# Patient Record
Sex: FEMALE | Race: BLACK OR AFRICAN AMERICAN | Employment: FULL TIME | URBAN - METROPOLITAN AREA
[De-identification: names, ages, dates, MRNs, and addresses within clinical notes are randomized per-mention and may not be internally consistent; named-entity substitution may affect disease eponyms.]

---

## 2018-05-24 ENCOUNTER — HOSPITAL ENCOUNTER (EMERGENCY)
Age: 43
Discharge: HOME | End: 2018-05-25
Payer: COMMERCIAL

## 2018-05-24 ENCOUNTER — APPOINTMENT (OUTPATIENT)
Dept: GENERAL RADIOLOGY | Age: 43
End: 2018-05-24
Payer: COMMERCIAL

## 2018-05-24 VITALS
TEMPERATURE: 97.6 F | WEIGHT: 189 LBS | BODY MASS INDEX: 34.78 KG/M2 | DIASTOLIC BLOOD PRESSURE: 79 MMHG | RESPIRATION RATE: 16 BRPM | OXYGEN SATURATION: 98 % | HEIGHT: 62 IN | SYSTOLIC BLOOD PRESSURE: 129 MMHG | HEART RATE: 80 BPM

## 2018-05-24 DIAGNOSIS — R07.89 CHEST WALL PAIN: Primary | ICD-10-CM

## 2018-05-24 LAB
ALBUMIN SERPL-MCNC: 4.2 G/DL (ref 3.5–5.2)
ALP BLD-CCNC: 82 U/L (ref 35–104)
ALT SERPL-CCNC: 13 U/L (ref 0–32)
ANION GAP SERPL CALCULATED.3IONS-SCNC: 14 MMOL/L (ref 7–16)
AST SERPL-CCNC: 14 U/L (ref 0–31)
BACTERIA: ABNORMAL /HPF
BASOPHILS ABSOLUTE: 0.05 E9/L (ref 0–0.2)
BASOPHILS RELATIVE PERCENT: 0.6 % (ref 0–2)
BILIRUB SERPL-MCNC: <0.2 MG/DL (ref 0–1.2)
BILIRUBIN URINE: NEGATIVE
BLOOD, URINE: ABNORMAL
BUN BLDV-MCNC: 15 MG/DL (ref 6–20)
CALCIUM SERPL-MCNC: 9.1 MG/DL (ref 8.6–10.2)
CHLORIDE BLD-SCNC: 104 MMOL/L (ref 98–107)
CHP ED QC CHECK: YES
CLARITY: CLEAR
CO2: 24 MMOL/L (ref 22–29)
COLOR: YELLOW
CREAT SERPL-MCNC: 1.1 MG/DL (ref 0.5–1)
EKG ATRIAL RATE: 74 BPM
EKG P AXIS: 66 DEGREES
EKG P-R INTERVAL: 164 MS
EKG Q-T INTERVAL: 372 MS
EKG QRS DURATION: 88 MS
EKG QTC CALCULATION (BAZETT): 412 MS
EKG R AXIS: 45 DEGREES
EKG T AXIS: 49 DEGREES
EKG VENTRICULAR RATE: 74 BPM
EOSINOPHILS ABSOLUTE: 0.36 E9/L (ref 0.05–0.5)
EOSINOPHILS RELATIVE PERCENT: 4 % (ref 0–6)
EPITHELIAL CELLS, UA: ABNORMAL /HPF
GFR AFRICAN AMERICAN: >60
GFR NON-AFRICAN AMERICAN: >60 ML/MIN/1.73
GLUCOSE BLD-MCNC: 97 MG/DL (ref 74–109)
GLUCOSE URINE: NEGATIVE MG/DL
HCT VFR BLD CALC: 38.4 % (ref 34–48)
HEMOGLOBIN: 12.1 G/DL (ref 11.5–15.5)
IMMATURE GRANULOCYTES #: 0.02 E9/L
IMMATURE GRANULOCYTES %: 0.2 % (ref 0–5)
KETONES, URINE: ABNORMAL MG/DL
LEUKOCYTE ESTERASE, URINE: NEGATIVE
LYMPHOCYTES ABSOLUTE: 3.18 E9/L (ref 1.5–4)
LYMPHOCYTES RELATIVE PERCENT: 35.4 % (ref 20–42)
MCH RBC QN AUTO: 28.2 PG (ref 26–35)
MCHC RBC AUTO-ENTMCNC: 31.5 % (ref 32–34.5)
MCV RBC AUTO: 89.5 FL (ref 80–99.9)
MONOCYTES ABSOLUTE: 0.61 E9/L (ref 0.1–0.95)
MONOCYTES RELATIVE PERCENT: 6.8 % (ref 2–12)
NEUTROPHILS ABSOLUTE: 4.76 E9/L (ref 1.8–7.3)
NEUTROPHILS RELATIVE PERCENT: 53 % (ref 43–80)
NITRITE, URINE: NEGATIVE
PDW BLD-RTO: 13.1 FL (ref 11.5–15)
PH UA: 6 (ref 5–9)
PLATELET # BLD: 235 E9/L (ref 130–450)
PMV BLD AUTO: 10.1 FL (ref 7–12)
POTASSIUM SERPL-SCNC: 3.6 MMOL/L (ref 3.5–5)
PREGNANCY TEST URINE, POC: NORMAL
PROTEIN UA: NEGATIVE MG/DL
RBC # BLD: 4.29 E12/L (ref 3.5–5.5)
RBC UA: ABNORMAL /HPF (ref 0–2)
SODIUM BLD-SCNC: 142 MMOL/L (ref 132–146)
SPECIFIC GRAVITY UA: 1.02 (ref 1–1.03)
TOTAL PROTEIN: 6.9 G/DL (ref 6.4–8.3)
TROPONIN: <0.01 NG/ML (ref 0–0.03)
UROBILINOGEN, URINE: 1 E.U./DL
WBC # BLD: 9 E9/L (ref 4.5–11.5)
WBC UA: ABNORMAL /HPF (ref 0–5)

## 2018-05-24 PROCEDURE — 71046 X-RAY EXAM CHEST 2 VIEWS: CPT

## 2018-05-24 PROCEDURE — 85025 COMPLETE CBC W/AUTO DIFF WBC: CPT

## 2018-05-24 PROCEDURE — 36415 COLL VENOUS BLD VENIPUNCTURE: CPT

## 2018-05-24 PROCEDURE — 81001 URINALYSIS AUTO W/SCOPE: CPT

## 2018-05-24 PROCEDURE — 84484 ASSAY OF TROPONIN QUANT: CPT

## 2018-05-24 PROCEDURE — 80053 COMPREHEN METABOLIC PANEL: CPT

## 2018-05-24 PROCEDURE — 99285 EMERGENCY DEPT VISIT HI MDM: CPT

## 2018-05-24 RX ORDER — KETOROLAC TROMETHAMINE 30 MG/ML
30 INJECTION, SOLUTION INTRAMUSCULAR; INTRAVENOUS ONCE
Status: DISCONTINUED | OUTPATIENT
Start: 2018-05-24 | End: 2018-05-25

## 2018-05-24 RX ORDER — 0.9 % SODIUM CHLORIDE 0.9 %
1000 INTRAVENOUS SOLUTION INTRAVENOUS ONCE
Status: DISCONTINUED | OUTPATIENT
Start: 2018-05-24 | End: 2018-05-25

## 2018-05-24 RX ORDER — ONDANSETRON 2 MG/ML
4 INJECTION INTRAMUSCULAR; INTRAVENOUS ONCE
Status: DISCONTINUED | OUTPATIENT
Start: 2018-05-24 | End: 2018-05-25

## 2018-05-24 ASSESSMENT — PAIN DESCRIPTION - PAIN TYPE: TYPE: ACUTE PAIN

## 2018-05-24 ASSESSMENT — HEART SCORE: ECG: 0

## 2018-05-24 ASSESSMENT — PAIN DESCRIPTION - DESCRIPTORS: DESCRIPTORS: SHARP

## 2018-05-24 ASSESSMENT — PAIN DESCRIPTION - FREQUENCY: FREQUENCY: INTERMITTENT

## 2018-05-24 ASSESSMENT — PAIN SCALES - GENERAL: PAINLEVEL_OUTOF10: 7

## 2018-05-25 PROCEDURE — 96372 THER/PROPH/DIAG INJ SC/IM: CPT

## 2018-05-25 PROCEDURE — 6360000002 HC RX W HCPCS: Performed by: NURSE PRACTITIONER

## 2018-05-25 RX ORDER — ONDANSETRON 4 MG/1
4 TABLET, ORALLY DISINTEGRATING ORAL ONCE
Status: COMPLETED | OUTPATIENT
Start: 2018-05-25 | End: 2018-05-25

## 2018-05-25 RX ORDER — NAPROXEN 500 MG/1
500 TABLET ORAL 2 TIMES DAILY PRN
Qty: 28 TABLET | Refills: 0 | Status: SHIPPED | OUTPATIENT
Start: 2018-05-25

## 2018-05-25 RX ORDER — KETOROLAC TROMETHAMINE 30 MG/ML
30 INJECTION, SOLUTION INTRAMUSCULAR; INTRAVENOUS ONCE
Status: COMPLETED | OUTPATIENT
Start: 2018-05-25 | End: 2018-05-25

## 2018-05-25 RX ADMIN — KETOROLAC TROMETHAMINE 30 MG: 30 INJECTION, SOLUTION INTRAMUSCULAR; INTRAVENOUS at 00:39

## 2018-05-25 RX ADMIN — ONDANSETRON 4 MG: 4 TABLET, ORALLY DISINTEGRATING ORAL at 00:39

## 2018-05-25 ASSESSMENT — PAIN SCALES - GENERAL: PAINLEVEL_OUTOF10: 4

## 2019-01-25 ENCOUNTER — HOSPITAL ENCOUNTER (OUTPATIENT)
Dept: ULTRASOUND IMAGING | Age: 44
Discharge: HOME | End: 2019-01-27
Payer: OTHER GOVERNMENT

## 2019-01-25 DIAGNOSIS — E01.0 THYROMEGALY: ICD-10-CM

## 2019-01-25 PROCEDURE — 76536 US EXAM OF HEAD AND NECK: CPT

## 2019-04-12 ENCOUNTER — HOSPITAL ENCOUNTER (OUTPATIENT)
Dept: GENERAL RADIOLOGY | Age: 44
Discharge: HOME | End: 2019-04-14
Payer: OTHER GOVERNMENT

## 2019-04-12 DIAGNOSIS — Z12.31 VISIT FOR SCREENING MAMMOGRAM: ICD-10-CM

## 2019-04-12 PROCEDURE — 77067 SCR MAMMO BI INCL CAD: CPT

## 2019-09-10 ENCOUNTER — HOSPITAL ENCOUNTER (EMERGENCY)
Age: 44
Discharge: HOME | End: 2019-09-10
Attending: EMERGENCY MEDICINE
Payer: COMMERCIAL

## 2019-09-10 VITALS
DIASTOLIC BLOOD PRESSURE: 88 MMHG | OXYGEN SATURATION: 98 % | BODY MASS INDEX: 34.96 KG/M2 | SYSTOLIC BLOOD PRESSURE: 126 MMHG | HEART RATE: 76 BPM | HEIGHT: 62 IN | TEMPERATURE: 98.5 F | RESPIRATION RATE: 16 BRPM | WEIGHT: 190 LBS

## 2019-09-10 DIAGNOSIS — S46.811A STRAIN OF RIGHT TRAPEZIUS MUSCLE, INITIAL ENCOUNTER: Primary | ICD-10-CM

## 2019-09-10 PROCEDURE — 99282 EMERGENCY DEPT VISIT SF MDM: CPT

## 2019-09-10 PROCEDURE — 6370000000 HC RX 637 (ALT 250 FOR IP): Performed by: EMERGENCY MEDICINE

## 2019-09-10 RX ORDER — IBUPROFEN 600 MG/1
600 TABLET ORAL ONCE
Status: COMPLETED | OUTPATIENT
Start: 2019-09-10 | End: 2019-09-10

## 2019-09-10 RX ADMIN — IBUPROFEN 600 MG: 600 TABLET, FILM COATED ORAL at 16:22

## 2019-09-10 ASSESSMENT — PAIN DESCRIPTION - ONSET: ONSET: SUDDEN

## 2019-09-10 ASSESSMENT — PAIN DESCRIPTION - FREQUENCY: FREQUENCY: CONTINUOUS

## 2019-09-10 ASSESSMENT — PAIN SCALES - GENERAL
PAINLEVEL_OUTOF10: 5

## 2019-09-10 ASSESSMENT — PAIN DESCRIPTION - LOCATION
LOCATION: SHOULDER
LOCATION: SHOULDER;NECK

## 2019-09-10 ASSESSMENT — PAIN DESCRIPTION - ORIENTATION
ORIENTATION: RIGHT
ORIENTATION: RIGHT

## 2019-09-10 ASSESSMENT — PAIN DESCRIPTION - PAIN TYPE
TYPE: ACUTE PAIN
TYPE: ACUTE PAIN

## 2019-09-10 ASSESSMENT — PAIN DESCRIPTION - PROGRESSION: CLINICAL_PROGRESSION: GRADUALLY WORSENING

## 2019-09-10 ASSESSMENT — PAIN DESCRIPTION - DESCRIPTORS
DESCRIPTORS: CONSTANT;DISCOMFORT
DESCRIPTORS: ACHING;CONSTANT;DISCOMFORT

## 2019-12-03 ENCOUNTER — HOSPITAL ENCOUNTER (EMERGENCY)
Age: 44
Discharge: HOME | End: 2019-12-04
Payer: OTHER GOVERNMENT

## 2019-12-03 VITALS
SYSTOLIC BLOOD PRESSURE: 135 MMHG | TEMPERATURE: 98 F | BODY MASS INDEX: 34.96 KG/M2 | WEIGHT: 190 LBS | DIASTOLIC BLOOD PRESSURE: 94 MMHG | RESPIRATION RATE: 15 BRPM | HEIGHT: 62 IN | HEART RATE: 69 BPM

## 2019-12-03 DIAGNOSIS — M79.605 LEFT LEG PAIN: Primary | ICD-10-CM

## 2019-12-03 LAB
APTT: 29.3 SEC (ref 24.5–35.1)
BASOPHILS ABSOLUTE: 0.03 E9/L (ref 0–0.2)
BASOPHILS RELATIVE PERCENT: 0.4 % (ref 0–2)
EOSINOPHILS ABSOLUTE: 0.24 E9/L (ref 0.05–0.5)
EOSINOPHILS RELATIVE PERCENT: 2.9 % (ref 0–6)
HCT VFR BLD CALC: 39.4 % (ref 34–48)
HEMOGLOBIN: 11.6 G/DL (ref 11.5–15.5)
IMMATURE GRANULOCYTES #: 0.02 E9/L
IMMATURE GRANULOCYTES %: 0.2 % (ref 0–5)
INR BLD: 1
LYMPHOCYTES ABSOLUTE: 2.64 E9/L (ref 1.5–4)
LYMPHOCYTES RELATIVE PERCENT: 31.9 % (ref 20–42)
MCH RBC QN AUTO: 26.7 PG (ref 26–35)
MCHC RBC AUTO-ENTMCNC: 29.4 % (ref 32–34.5)
MCV RBC AUTO: 90.8 FL (ref 80–99.9)
MONOCYTES ABSOLUTE: 0.57 E9/L (ref 0.1–0.95)
MONOCYTES RELATIVE PERCENT: 6.9 % (ref 2–12)
NEUTROPHILS ABSOLUTE: 4.78 E9/L (ref 1.8–7.3)
NEUTROPHILS RELATIVE PERCENT: 57.7 % (ref 43–80)
PDW BLD-RTO: 13.3 FL (ref 11.5–15)
PLATELET # BLD: 227 E9/L (ref 130–450)
PMV BLD AUTO: 10.2 FL (ref 7–12)
PROTHROMBIN TIME: 11.1 SEC (ref 9.3–12.4)
RBC # BLD: 4.34 E12/L (ref 3.5–5.5)
WBC # BLD: 8.3 E9/L (ref 4.5–11.5)

## 2019-12-03 PROCEDURE — 85730 THROMBOPLASTIN TIME PARTIAL: CPT

## 2019-12-03 PROCEDURE — 6370000000 HC RX 637 (ALT 250 FOR IP): Performed by: NURSE PRACTITIONER

## 2019-12-03 PROCEDURE — 85025 COMPLETE CBC W/AUTO DIFF WBC: CPT

## 2019-12-03 PROCEDURE — 85610 PROTHROMBIN TIME: CPT

## 2019-12-03 PROCEDURE — 36415 COLL VENOUS BLD VENIPUNCTURE: CPT

## 2019-12-03 PROCEDURE — 99283 EMERGENCY DEPT VISIT LOW MDM: CPT

## 2019-12-03 RX ORDER — OXYCODONE HYDROCHLORIDE AND ACETAMINOPHEN 5; 325 MG/1; MG/1
1 TABLET ORAL ONCE
Status: COMPLETED | OUTPATIENT
Start: 2019-12-03 | End: 2019-12-03

## 2019-12-03 RX ADMIN — OXYCODONE HYDROCHLORIDE AND ACETAMINOPHEN 1 TABLET: 5; 325 TABLET ORAL at 23:35

## 2019-12-03 ASSESSMENT — PAIN DESCRIPTION - LOCATION: LOCATION: LEG

## 2019-12-03 ASSESSMENT — PAIN DESCRIPTION - PAIN TYPE: TYPE: ACUTE PAIN

## 2019-12-03 ASSESSMENT — PAIN DESCRIPTION - ORIENTATION: ORIENTATION: LEFT

## 2019-12-03 ASSESSMENT — PAIN SCALES - GENERAL: PAINLEVEL_OUTOF10: 7

## 2019-12-04 ENCOUNTER — HOSPITAL ENCOUNTER (OUTPATIENT)
Dept: ULTRASOUND IMAGING | Age: 44
Discharge: HOME | End: 2019-12-06
Payer: OTHER GOVERNMENT

## 2019-12-04 DIAGNOSIS — M79.605 LEFT LEG PAIN: ICD-10-CM

## 2019-12-04 LAB
GFR AFRICAN AMERICAN: >60
GFR NON-AFRICAN AMERICAN: 60 ML/MIN/1.73
GLUCOSE BLD-MCNC: 91 MG/DL (ref 74–99)
PERFORMED ON: NORMAL
POC CHLORIDE: 108 MMOL/L (ref 100–108)
POC CREATININE: 1 MG/DL (ref 0.5–1)
POC POTASSIUM: 4 MMOL/L (ref 3.5–5)
POC SODIUM: 142 MMOL/L (ref 132–146)

## 2019-12-04 PROCEDURE — 82947 ASSAY GLUCOSE BLOOD QUANT: CPT

## 2019-12-04 PROCEDURE — 82565 ASSAY OF CREATININE: CPT

## 2019-12-04 PROCEDURE — 84132 ASSAY OF SERUM POTASSIUM: CPT

## 2019-12-04 PROCEDURE — 6360000002 HC RX W HCPCS: Performed by: NURSE PRACTITIONER

## 2019-12-04 PROCEDURE — 84295 ASSAY OF SERUM SODIUM: CPT

## 2019-12-04 PROCEDURE — 93971 EXTREMITY STUDY: CPT

## 2019-12-04 PROCEDURE — 82435 ASSAY OF BLOOD CHLORIDE: CPT

## 2019-12-04 RX ORDER — OXYCODONE HYDROCHLORIDE AND ACETAMINOPHEN 5; 325 MG/1; MG/1
1 TABLET ORAL EVERY 6 HOURS PRN
Qty: 10 TABLET | Refills: 0 | Status: SHIPPED | OUTPATIENT
Start: 2019-12-04 | End: 2019-12-07

## 2019-12-04 RX ADMIN — ENOXAPARIN SODIUM 90 MG: 100 INJECTION SUBCUTANEOUS at 00:31

## 2019-12-04 ASSESSMENT — PAIN DESCRIPTION - ORIENTATION: ORIENTATION: LEFT

## 2019-12-04 ASSESSMENT — PAIN DESCRIPTION - LOCATION: LOCATION: LEG

## 2019-12-04 ASSESSMENT — PAIN SCALES - GENERAL: PAINLEVEL_OUTOF10: 4

## 2019-12-04 ASSESSMENT — PAIN DESCRIPTION - PROGRESSION: CLINICAL_PROGRESSION: GRADUALLY IMPROVING

## 2019-12-04 ASSESSMENT — PAIN DESCRIPTION - DESCRIPTORS: DESCRIPTORS: ACHING

## 2019-12-04 ASSESSMENT — PAIN DESCRIPTION - PAIN TYPE: TYPE: ACUTE PAIN

## 2019-12-04 ASSESSMENT — PAIN DESCRIPTION - FREQUENCY: FREQUENCY: CONTINUOUS

## 2020-06-16 ENCOUNTER — HOSPITAL ENCOUNTER (OUTPATIENT)
Dept: ULTRASOUND IMAGING | Age: 45
Discharge: HOME | End: 2020-06-18
Payer: COMMERCIAL

## 2020-06-16 PROCEDURE — 93971 EXTREMITY STUDY: CPT

## 2020-09-08 ENCOUNTER — HOSPITAL ENCOUNTER (OUTPATIENT)
Dept: GENERAL RADIOLOGY | Age: 45
Discharge: HOME | End: 2020-09-10
Payer: OTHER GOVERNMENT

## 2020-09-08 PROCEDURE — 77067 SCR MAMMO BI INCL CAD: CPT

## 2020-10-19 ENCOUNTER — HOSPITAL ENCOUNTER (EMERGENCY)
Age: 45
Discharge: HOME | End: 2020-10-20
Attending: EMERGENCY MEDICINE
Payer: OTHER GOVERNMENT

## 2020-10-19 ENCOUNTER — APPOINTMENT (OUTPATIENT)
Dept: CT IMAGING | Age: 45
End: 2020-10-19
Payer: OTHER GOVERNMENT

## 2020-10-19 LAB
ALBUMIN SERPL-MCNC: 4.2 G/DL (ref 3.5–5.2)
ALP BLD-CCNC: 99 U/L (ref 35–104)
ALT SERPL-CCNC: 13 U/L (ref 0–32)
ANION GAP SERPL CALCULATED.3IONS-SCNC: 9 MMOL/L (ref 7–16)
AST SERPL-CCNC: 17 U/L (ref 0–31)
BACTERIA: ABNORMAL /HPF
BASOPHILS ABSOLUTE: 0.04 E9/L (ref 0–0.2)
BASOPHILS RELATIVE PERCENT: 0.4 % (ref 0–2)
BILIRUB SERPL-MCNC: <0.2 MG/DL (ref 0–1.2)
BILIRUBIN URINE: NEGATIVE
BLOOD, URINE: ABNORMAL
BUN BLDV-MCNC: 13 MG/DL (ref 6–20)
CALCIUM SERPL-MCNC: 9.8 MG/DL (ref 8.6–10.2)
CHLORIDE BLD-SCNC: 109 MMOL/L (ref 98–107)
CLARITY: CLEAR
CO2: 25 MMOL/L (ref 22–29)
COLOR: YELLOW
CREAT SERPL-MCNC: 1.1 MG/DL (ref 0.5–1)
EOSINOPHILS ABSOLUTE: 0.46 E9/L (ref 0.05–0.5)
EOSINOPHILS RELATIVE PERCENT: 4.9 % (ref 0–6)
GFR AFRICAN AMERICAN: >60
GFR NON-AFRICAN AMERICAN: >60 ML/MIN/1.73
GLUCOSE BLD-MCNC: 93 MG/DL (ref 74–99)
GLUCOSE URINE: NEGATIVE MG/DL
HCG, URINE, POC: NEGATIVE
HCT VFR BLD CALC: 40.1 % (ref 34–48)
HEMOGLOBIN: 12.3 G/DL (ref 11.5–15.5)
IMMATURE GRANULOCYTES #: 0.02 E9/L
IMMATURE GRANULOCYTES %: 0.2 % (ref 0–5)
KETONES, URINE: NEGATIVE MG/DL
LEUKOCYTE ESTERASE, URINE: NEGATIVE
LIPASE: 33 U/L (ref 13–60)
LYMPHOCYTES ABSOLUTE: 3.16 E9/L (ref 1.5–4)
LYMPHOCYTES RELATIVE PERCENT: 33.8 % (ref 20–42)
Lab: NORMAL
MCH RBC QN AUTO: 27.3 PG (ref 26–35)
MCHC RBC AUTO-ENTMCNC: 30.7 % (ref 32–34.5)
MCV RBC AUTO: 89.1 FL (ref 80–99.9)
MONOCYTES ABSOLUTE: 0.51 E9/L (ref 0.1–0.95)
MONOCYTES RELATIVE PERCENT: 5.4 % (ref 2–12)
MUCUS: PRESENT /LPF
NEGATIVE QC PASS/FAIL: NORMAL
NEUTROPHILS ABSOLUTE: 5.17 E9/L (ref 1.8–7.3)
NEUTROPHILS RELATIVE PERCENT: 55.3 % (ref 43–80)
NITRITE, URINE: NEGATIVE
PDW BLD-RTO: 13.8 FL (ref 11.5–15)
PH UA: 6.5 (ref 5–9)
PLATELET # BLD: 288 E9/L (ref 130–450)
PMV BLD AUTO: 10.1 FL (ref 7–12)
POSITIVE QC PASS/FAIL: NORMAL
POTASSIUM REFLEX MAGNESIUM: 4 MMOL/L (ref 3.5–5)
PROTEIN UA: NEGATIVE MG/DL
RBC # BLD: 4.5 E12/L (ref 3.5–5.5)
RBC UA: ABNORMAL /HPF (ref 0–2)
SODIUM BLD-SCNC: 143 MMOL/L (ref 132–146)
SPECIFIC GRAVITY UA: 1.02 (ref 1–1.03)
TOTAL PROTEIN: 7.3 G/DL (ref 6.4–8.3)
UROBILINOGEN, URINE: 0.2 E.U./DL
WBC # BLD: 9.4 E9/L (ref 4.5–11.5)
WBC UA: ABNORMAL /HPF (ref 0–5)

## 2020-10-19 PROCEDURE — 74177 CT ABD & PELVIS W/CONTRAST: CPT

## 2020-10-19 PROCEDURE — 81001 URINALYSIS AUTO W/SCOPE: CPT

## 2020-10-19 PROCEDURE — 6360000004 HC RX CONTRAST MEDICATION: Performed by: RADIOLOGY

## 2020-10-19 PROCEDURE — 83690 ASSAY OF LIPASE: CPT

## 2020-10-19 PROCEDURE — 2580000003 HC RX 258: Performed by: EMERGENCY MEDICINE

## 2020-10-19 PROCEDURE — 6360000002 HC RX W HCPCS: Performed by: EMERGENCY MEDICINE

## 2020-10-19 PROCEDURE — 96372 THER/PROPH/DIAG INJ SC/IM: CPT

## 2020-10-19 PROCEDURE — 99285 EMERGENCY DEPT VISIT HI MDM: CPT

## 2020-10-19 PROCEDURE — 96375 TX/PRO/DX INJ NEW DRUG ADDON: CPT

## 2020-10-19 PROCEDURE — 2500000003 HC RX 250 WO HCPCS: Performed by: EMERGENCY MEDICINE

## 2020-10-19 PROCEDURE — 80053 COMPREHEN METABOLIC PANEL: CPT

## 2020-10-19 PROCEDURE — 85025 COMPLETE CBC W/AUTO DIFF WBC: CPT

## 2020-10-19 PROCEDURE — 96374 THER/PROPH/DIAG INJ IV PUSH: CPT

## 2020-10-19 RX ORDER — 0.9 % SODIUM CHLORIDE 0.9 %
1000 INTRAVENOUS SOLUTION INTRAVENOUS ONCE
Status: COMPLETED | OUTPATIENT
Start: 2020-10-19 | End: 2020-10-19

## 2020-10-19 RX ORDER — KETOROLAC TROMETHAMINE 30 MG/ML
15 INJECTION, SOLUTION INTRAMUSCULAR; INTRAVENOUS ONCE
Status: COMPLETED | OUTPATIENT
Start: 2020-10-19 | End: 2020-10-19

## 2020-10-19 RX ADMIN — SODIUM CHLORIDE 1000 ML: 9 INJECTION, SOLUTION INTRAVENOUS at 21:50

## 2020-10-19 RX ADMIN — IOPAMIDOL 75 ML: 755 INJECTION, SOLUTION INTRAVENOUS at 21:57

## 2020-10-19 RX ADMIN — FAMOTIDINE 20 MG: 10 INJECTION INTRAVENOUS at 21:50

## 2020-10-19 RX ADMIN — KETOROLAC TROMETHAMINE 15 MG: 30 INJECTION, SOLUTION INTRAMUSCULAR; INTRAVENOUS at 21:51

## 2020-10-19 ASSESSMENT — ENCOUNTER SYMPTOMS
NAUSEA: 0
SHORTNESS OF BREATH: 0
BACK PAIN: 1
BLOOD IN STOOL: 0
DIARRHEA: 0
COLOR CHANGE: 0
COUGH: 0
VOMITING: 0
RHINORRHEA: 0
ABDOMINAL PAIN: 1

## 2020-10-19 ASSESSMENT — PAIN DESCRIPTION - DESCRIPTORS: DESCRIPTORS: SHARP;ACHING

## 2020-10-19 ASSESSMENT — PAIN DESCRIPTION - LOCATION: LOCATION: ABDOMEN

## 2020-10-19 ASSESSMENT — PAIN SCALES - GENERAL
PAINLEVEL_OUTOF10: 8
PAINLEVEL_OUTOF10: 8

## 2020-10-19 ASSESSMENT — PAIN DESCRIPTION - PAIN TYPE: TYPE: ACUTE PAIN

## 2020-10-19 ASSESSMENT — PAIN DESCRIPTION - ORIENTATION: ORIENTATION: RIGHT

## 2020-10-20 ENCOUNTER — APPOINTMENT (OUTPATIENT)
Dept: ULTRASOUND IMAGING | Age: 45
End: 2020-10-20
Payer: OTHER GOVERNMENT

## 2020-10-20 VITALS
WEIGHT: 220 LBS | RESPIRATION RATE: 14 BRPM | TEMPERATURE: 97.4 F | DIASTOLIC BLOOD PRESSURE: 60 MMHG | SYSTOLIC BLOOD PRESSURE: 107 MMHG | HEIGHT: 62 IN | HEART RATE: 75 BPM | BODY MASS INDEX: 40.48 KG/M2 | OXYGEN SATURATION: 97 %

## 2020-10-20 PROCEDURE — 6360000002 HC RX W HCPCS: Performed by: EMERGENCY MEDICINE

## 2020-10-20 PROCEDURE — 76830 TRANSVAGINAL US NON-OB: CPT

## 2020-10-20 PROCEDURE — 6370000000 HC RX 637 (ALT 250 FOR IP): Performed by: EMERGENCY MEDICINE

## 2020-10-20 RX ORDER — CEFTRIAXONE 1 G/1
1 INJECTION, POWDER, FOR SOLUTION INTRAMUSCULAR; INTRAVENOUS ONCE
Status: COMPLETED | OUTPATIENT
Start: 2020-10-20 | End: 2020-10-20

## 2020-10-20 RX ORDER — DOXYCYCLINE HYCLATE 100 MG/1
100 CAPSULE ORAL ONCE
Status: COMPLETED | OUTPATIENT
Start: 2020-10-20 | End: 2020-10-20

## 2020-10-20 RX ORDER — DOXYCYCLINE HYCLATE 100 MG
100 TABLET ORAL 2 TIMES DAILY
Qty: 28 TABLET | Refills: 0 | Status: SHIPPED | OUTPATIENT
Start: 2020-10-20 | End: 2020-11-03

## 2020-10-20 RX ORDER — KETOROLAC TROMETHAMINE 30 MG/ML
15 INJECTION, SOLUTION INTRAMUSCULAR; INTRAVENOUS ONCE
Status: DISCONTINUED | OUTPATIENT
Start: 2020-10-20 | End: 2020-10-20 | Stop reason: HOSPADM

## 2020-10-20 RX ADMIN — CEFTRIAXONE 1 G: 1 INJECTION, POWDER, FOR SOLUTION INTRAMUSCULAR; INTRAVENOUS at 02:05

## 2020-10-20 RX ADMIN — DOXYCYCLINE HYCLATE 100 MG: 100 CAPSULE ORAL at 02:05

## 2020-10-20 ASSESSMENT — PAIN SCALES - GENERAL
PAINLEVEL_OUTOF10: 8
PAINLEVEL_OUTOF10: 8

## 2020-10-20 NOTE — ED PROVIDER NOTES
Socioeconomic History    Marital status: Single     Spouse name: None    Number of children: None    Years of education: None    Highest education level: None   Occupational History    None   Social Needs    Financial resource strain: None    Food insecurity     Worry: None     Inability: None    Transportation needs     Medical: None     Non-medical: None   Tobacco Use    Smoking status: Never Smoker    Smokeless tobacco: Never Used   Substance and Sexual Activity    Alcohol use: No    Drug use: No    Sexual activity: None   Lifestyle    Physical activity     Days per week: None     Minutes per session: None    Stress: None   Relationships    Social connections     Talks on phone: None     Gets together: None     Attends Ca[employer]ism service: None     Active member of club or organization: None     Attends meetings of clubs or organizations: None     Relationship status: None    Intimate partner violence     Fear of current or ex partner: None     Emotionally abused: None     Physically abused: None     Forced sexual activity: None   Other Topics Concern    None   Social History Narrative    None       Family History:   History reviewed. No pertinent family history. The patients home medications have been reviewed. Allergies: Allergies   Allergen Reactions    Effexor [Venlafaxine]            ---------------------------------------------------PHYSICAL EXAM--------------------------------------    BP (!) 146/79   Pulse 88   Temp 97.4 °F (36.3 °C)   Resp 16   Ht 5' 2\" (1.575 m)   Wt 220 lb (99.8 kg)   LMP 04/12/2013   SpO2 100%   BMI 40.24 kg/m²     Physical Exam  Constitutional:       General: She is not in acute distress. Appearance: She is not toxic-appearing. HENT:      Mouth/Throat:      Mouth: Mucous membranes are moist.   Eyes:      General: No scleral icterus. Extraocular Movements: Extraocular movements intact.       Pupils: Pupils are equal, round, and reactive to light. Neck:      Musculoskeletal: Normal range of motion and neck supple. Cardiovascular:      Rate and Rhythm: Normal rate and regular rhythm. Pulses: Normal pulses. Heart sounds: Normal heart sounds. No murmur. Pulmonary:      Effort: Pulmonary effort is normal. No respiratory distress. Breath sounds: Normal breath sounds. No wheezing or rales. Abdominal:      General: There is no distension. Palpations: Abdomen is soft. Tenderness: There is abdominal tenderness (diffuse w/ focal ttp in LLQ). There is guarding. There is no right CVA tenderness, left CVA tenderness or rebound. Musculoskeletal: Normal range of motion. General: No swelling or tenderness. Skin:     General: Skin is warm and dry. Neurological:      Mental Status: She is alert and oriented to person, place, and time. Comments: Moves all extremities with appropriate strength. Sensation grossly intact in all extremities. -------------------------------------------------- RESULTS -------------------------------------------------  I have personally reviewed all laboratory and imaging results for this patient. Results are listed below. LABS:  Labs Reviewed   URINALYSIS WITH MICROSCOPIC - Abnormal; Notable for the following components:       Result Value    Mucus, UA Present (*)     Bacteria, UA RARE (*)     All other components within normal limits   COMPREHENSIVE METABOLIC PANEL W/ REFLEX TO MG FOR LOW K - Abnormal; Notable for the following components:    Chloride 109 (*)     CREATININE 1.1 (*)     All other components within normal limits   CBC WITH AUTO DIFFERENTIAL - Abnormal; Notable for the following components:    MCHC 30.7 (*)     All other components within normal limits   LIPASE   POC PREGNANCY UR-QUAL       RADIOLOGY:  Interpreted personally and by Radiologist.  [address] NON OB TRANSVAGINAL   Final Result   Bilateral adnexal hydrosalpinx noted. Mild free fluid in the pelvis. Correlates with CT imaging. CT ABDOMEN PELVIS W IV CONTRAST Additional Contrast? None   Final Result   1. Fat attenuating mass with surrounding inflammatory changes is located in   the left lower anterior pelvis. Findings suggest epiploic appendagitis   versus omental infarct. 2.  Redemonstration of multiple hypoattenuating tubular masses located in the   pelvis adjacent to the uterus. This finding may represent hydrosalpinx   versus fluid-filled loops of small bowel. Clinical correlation recommended   for possibility of recurrence tubo-ovarian abscess. CT with oral contrast   may be helpful for further evaluation.               ------------------------- NURSING NOTES AND VITALS REVIEWED ---------------------------   The nursing notes within the ED encounter and vital signs as below have been reviewed by myself. BP (!) 146/79   Pulse 88   Temp 97.4 °F (36.3 °C)   Resp 16   Ht 5' 2\" (1.575 m)   Wt 220 lb (99.8 kg)   LMP 04/12/2013   SpO2 100%   BMI 40.24 kg/m²   Oxygen Saturation Interpretation: Normal    The patients available past medical records and past encounters were reviewed. ------------------------------ ED COURSE/MEDICAL DECISION MAKING----------------------  Medications   ketorolac (TORADOL) injection 15 mg (15 mg Intravenous Not Given 10/20/20 0205)   famotidine (PEPCID) injection 20 mg (20 mg Intravenous Given 10/19/20 2150)   ketorolac (TORADOL) injection 15 mg (15 mg Intravenous Given 10/19/20 2151)   0.9 % sodium chloride bolus (0 mLs Intravenous Stopped 10/19/20 2331)   iopamidol (ISOVUE-370) 76 % injection 75 mL (75 mLs Intravenous Given 10/19/20 2157)   cefTRIAXone (ROCEPHIN) injection 1 g (1 g Intramuscular Given 10/20/20 0205)   doxycycline hyclate (VIBRAMYCIN) capsule 100 mg (100 mg Oral Given 10/20/20 0205)     Consultations:             OBGYN - discussed w/ Dr Denise English who recommends outpatient tx for PID and f/u in clinic    Counseling:    The emergency provider has spoken with the patient and discussed todays results, in addition to providing specific details for the plan of care and counseling regarding the diagnosis and prognosis. Questions are answered at this time and they are agreeable with the plan. ED Course/Medical Decision Makin y.o. female here with lower abdominal pain. Non-toxic appearing, afebrile, hemodynamically stable, and in no acute distress. No peritonitis. No discharge and patient denies being sexually active. Hx of hysterectomy. Labs unrevealing, imaging c/w bilateral hydrosalpinx. Treated symptomatically and on reevaluation feels better and wants to go home. Discussed w/ OBGYN as above. Treated for PID as outpatient w/ OBGYN follow up. After discussion of findings and return precautions, patient agrees with plan for discharge and outpatient follow up with OBGYN       --------------------------------- [address] ---------------------------------    IMPRESSION  1. Hydrosalpinx    2. Abdominal pain, unspecified abdominal location        DISPOSITION  Disposition: Discharge to home  Patient condition is good    NOTE: This report was transcribed using voice recognition software.  Every effort was made to ensure accuracy; however, inadvertent computerized transcription errors may be present    Lynn Portillo MD  Attending Emergency Physician          Lynn Portillo MD  10/20/20 3369

## 2021-03-25 ENCOUNTER — HOSPITAL ENCOUNTER (OUTPATIENT)
Age: 46
Discharge: HOME | End: 2021-03-27

## 2021-03-25 PROCEDURE — 88305 TISSUE EXAM BY PATHOLOGIST: CPT

## 2021-09-15 ENCOUNTER — HOSPITAL ENCOUNTER (OUTPATIENT)
Dept: GENERAL RADIOLOGY | Age: 46
Discharge: HOME | End: 2021-09-17
Payer: OTHER GOVERNMENT

## 2021-09-15 DIAGNOSIS — Z12.31 VISIT FOR SCREENING MAMMOGRAM: ICD-10-CM

## 2021-09-15 PROCEDURE — 77063 BREAST TOMOSYNTHESIS BI: CPT

## 2022-10-13 ENCOUNTER — APPOINTMENT (OUTPATIENT)
Dept: GENERAL RADIOLOGY | Age: 47
End: 2022-10-13
Payer: OTHER GOVERNMENT

## 2022-10-13 ENCOUNTER — HOSPITAL ENCOUNTER (EMERGENCY)
Age: 47
Discharge: HOME | End: 2022-10-13
Attending: EMERGENCY MEDICINE
Payer: OTHER GOVERNMENT

## 2022-10-13 ENCOUNTER — APPOINTMENT (OUTPATIENT)
Dept: CT IMAGING | Age: 47
End: 2022-10-13
Payer: OTHER GOVERNMENT

## 2022-10-13 VITALS
OXYGEN SATURATION: 97 % | HEIGHT: 62 IN | BODY MASS INDEX: 39.93 KG/M2 | TEMPERATURE: 98 F | WEIGHT: 217 LBS | RESPIRATION RATE: 16 BRPM | DIASTOLIC BLOOD PRESSURE: 89 MMHG | HEART RATE: 63 BPM | SYSTOLIC BLOOD PRESSURE: 109 MMHG

## 2022-10-13 DIAGNOSIS — R51.9 ACUTE NONINTRACTABLE HEADACHE, UNSPECIFIED HEADACHE TYPE: ICD-10-CM

## 2022-10-13 DIAGNOSIS — B34.9 VIRAL SYNDROME: Primary | ICD-10-CM

## 2022-10-13 LAB
ALBUMIN SERPL-MCNC: 3.9 G/DL (ref 3.5–5.2)
ALP BLD-CCNC: 95 U/L (ref 35–104)
ALT SERPL-CCNC: 13 U/L (ref 0–32)
ANION GAP SERPL CALCULATED.3IONS-SCNC: 8 MMOL/L (ref 7–16)
AST SERPL-CCNC: 17 U/L (ref 0–31)
BACTERIA: ABNORMAL /HPF
BASOPHILS ABSOLUTE: 0.04 E9/L (ref 0–0.2)
BASOPHILS RELATIVE PERCENT: 0.5 % (ref 0–2)
BILIRUB SERPL-MCNC: <0.2 MG/DL (ref 0–1.2)
BILIRUBIN URINE: NEGATIVE
BLOOD, URINE: ABNORMAL
BUN BLDV-MCNC: 9 MG/DL (ref 6–20)
CALCIUM SERPL-MCNC: 9.5 MG/DL (ref 8.6–10.2)
CHLORIDE BLD-SCNC: 103 MMOL/L (ref 98–107)
CLARITY: CLEAR
CO2: 31 MMOL/L (ref 22–29)
COLOR: YELLOW
CREAT SERPL-MCNC: 1.1 MG/DL (ref 0.5–1)
EOSINOPHILS ABSOLUTE: 0.53 E9/L (ref 0.05–0.5)
EOSINOPHILS RELATIVE PERCENT: 6.6 % (ref 0–6)
GFR AFRICAN AMERICAN: >60
GFR NON-AFRICAN AMERICAN: >60 ML/MIN/1.73
GLUCOSE BLD-MCNC: 85 MG/DL (ref 74–99)
GLUCOSE URINE: NEGATIVE MG/DL
HCT VFR BLD CALC: 42.1 % (ref 34–48)
HEMOGLOBIN: 12.9 G/DL (ref 11.5–15.5)
IMMATURE GRANULOCYTES #: 0.02 E9/L
IMMATURE GRANULOCYTES %: 0.2 % (ref 0–5)
INFLUENZA A BY PCR: NOT DETECTED
INFLUENZA B BY PCR: NOT DETECTED
KETONES, URINE: ABNORMAL MG/DL
LACTIC ACID: 0.7 MMOL/L (ref 0.5–2.2)
LEUKOCYTE ESTERASE, URINE: NEGATIVE
LYMPHOCYTES ABSOLUTE: 2.12 E9/L (ref 1.5–4)
LYMPHOCYTES RELATIVE PERCENT: 26.3 % (ref 20–42)
MAGNESIUM: 2.1 MG/DL (ref 1.6–2.6)
MCH RBC QN AUTO: 27.1 PG (ref 26–35)
MCHC RBC AUTO-ENTMCNC: 30.6 % (ref 32–34.5)
MCV RBC AUTO: 88.4 FL (ref 80–99.9)
MONO TEST: NEGATIVE
MONOCYTES ABSOLUTE: 0.7 E9/L (ref 0.1–0.95)
MONOCYTES RELATIVE PERCENT: 8.7 % (ref 2–12)
MUCUS: PRESENT /LPF
NEUTROPHILS ABSOLUTE: 4.65 E9/L (ref 1.8–7.3)
NEUTROPHILS RELATIVE PERCENT: 57.7 % (ref 43–80)
NITRITE, URINE: NEGATIVE
PDW BLD-RTO: 13.7 FL (ref 11.5–15)
PH UA: 7.5 (ref 5–9)
PLATELET # BLD: 261 E9/L (ref 130–450)
PMV BLD AUTO: 10.5 FL (ref 7–12)
POTASSIUM SERPL-SCNC: 3.8 MMOL/L (ref 3.5–5)
PROTEIN UA: ABNORMAL MG/DL
RBC # BLD: 4.76 E12/L (ref 3.5–5.5)
RBC UA: ABNORMAL /HPF (ref 0–2)
SARS-COV-2, NAAT: NOT DETECTED
SODIUM BLD-SCNC: 142 MMOL/L (ref 132–146)
SPECIFIC GRAVITY UA: 1.02 (ref 1–1.03)
STREP GRP A PCR: NEGATIVE
TOTAL PROTEIN: 7 G/DL (ref 6.4–8.3)
UROBILINOGEN, URINE: 2 E.U./DL
WBC # BLD: 8.1 E9/L (ref 4.5–11.5)
WBC UA: ABNORMAL /HPF (ref 0–5)

## 2022-10-13 PROCEDURE — 6360000002 HC RX W HCPCS: Performed by: EMERGENCY MEDICINE

## 2022-10-13 PROCEDURE — 36415 COLL VENOUS BLD VENIPUNCTURE: CPT

## 2022-10-13 PROCEDURE — 86308 HETEROPHILE ANTIBODY SCREEN: CPT

## 2022-10-13 PROCEDURE — 87635 SARS-COV-2 COVID-19 AMP PRB: CPT

## 2022-10-13 PROCEDURE — 70450 CT HEAD/BRAIN W/O DYE: CPT

## 2022-10-13 PROCEDURE — 96375 TX/PRO/DX INJ NEW DRUG ADDON: CPT

## 2022-10-13 PROCEDURE — 85025 COMPLETE CBC W/AUTO DIFF WBC: CPT

## 2022-10-13 PROCEDURE — 87502 INFLUENZA DNA AMP PROBE: CPT

## 2022-10-13 PROCEDURE — 2580000003 HC RX 258: Performed by: EMERGENCY MEDICINE

## 2022-10-13 PROCEDURE — 87880 STREP A ASSAY W/OPTIC: CPT

## 2022-10-13 PROCEDURE — 6370000000 HC RX 637 (ALT 250 FOR IP): Performed by: EMERGENCY MEDICINE

## 2022-10-13 PROCEDURE — 96374 THER/PROPH/DIAG INJ IV PUSH: CPT

## 2022-10-13 PROCEDURE — 83605 ASSAY OF LACTIC ACID: CPT

## 2022-10-13 PROCEDURE — 83735 ASSAY OF MAGNESIUM: CPT

## 2022-10-13 PROCEDURE — 99284 EMERGENCY DEPT VISIT MOD MDM: CPT

## 2022-10-13 PROCEDURE — 80053 COMPREHEN METABOLIC PANEL: CPT

## 2022-10-13 PROCEDURE — 81001 URINALYSIS AUTO W/SCOPE: CPT

## 2022-10-13 PROCEDURE — 71045 X-RAY EXAM CHEST 1 VIEW: CPT

## 2022-10-13 RX ORDER — KETOROLAC TROMETHAMINE 30 MG/ML
30 INJECTION, SOLUTION INTRAMUSCULAR; INTRAVENOUS ONCE
Status: COMPLETED | OUTPATIENT
Start: 2022-10-13 | End: 2022-10-13

## 2022-10-13 RX ORDER — ACETAMINOPHEN 500 MG
1000 TABLET ORAL ONCE
Status: COMPLETED | OUTPATIENT
Start: 2022-10-13 | End: 2022-10-13

## 2022-10-13 RX ORDER — PROCHLORPERAZINE EDISYLATE 5 MG/ML
10 INJECTION INTRAMUSCULAR; INTRAVENOUS ONCE
Status: COMPLETED | OUTPATIENT
Start: 2022-10-13 | End: 2022-10-13

## 2022-10-13 RX ORDER — 0.9 % SODIUM CHLORIDE 0.9 %
1000 INTRAVENOUS SOLUTION INTRAVENOUS ONCE
Status: COMPLETED | OUTPATIENT
Start: 2022-10-13 | End: 2022-10-13

## 2022-10-13 RX ADMIN — KETOROLAC TROMETHAMINE 30 MG: 30 INJECTION, SOLUTION INTRAMUSCULAR at 08:56

## 2022-10-13 RX ADMIN — PROCHLORPERAZINE EDISYLATE 10 MG: 5 INJECTION INTRAMUSCULAR; INTRAVENOUS at 08:56

## 2022-10-13 RX ADMIN — SODIUM CHLORIDE 1000 ML: 9 INJECTION, SOLUTION INTRAVENOUS at 08:55

## 2022-10-13 RX ADMIN — ACETAMINOPHEN 1000 MG: 500 TABLET ORAL at 07:39

## 2022-10-13 ASSESSMENT — PAIN SCALES - GENERAL
PAINLEVEL_OUTOF10: 4
PAINLEVEL_OUTOF10: 6
PAINLEVEL_OUTOF10: 10

## 2022-10-13 ASSESSMENT — PAIN DESCRIPTION - PAIN TYPE: TYPE: ACUTE PAIN

## 2022-10-13 ASSESSMENT — PAIN DESCRIPTION - LOCATION: LOCATION: HEAD

## 2022-10-13 ASSESSMENT — PAIN DESCRIPTION - FREQUENCY: FREQUENCY: CONTINUOUS

## 2022-10-13 ASSESSMENT — PAIN - FUNCTIONAL ASSESSMENT: PAIN_FUNCTIONAL_ASSESSMENT: 0-10

## 2022-10-13 ASSESSMENT — PAIN DESCRIPTION - DESCRIPTORS: DESCRIPTORS: ACHING;SHARP

## 2022-10-13 NOTE — ED NOTES
Department of Emergency Medicine  FIRST PROVIDER TRIAGE NOTE             Independent MLP           10/13/22  1:04 AM EDT    Date of Encounter: 10/13/22   MRN: [id number]      HPI: Keerthi Pressley is a 51 y.o. female who presents to the ED for Concern For COVID-19 (Headache/ sinus pressure/ body aches low grade fever; denies cheat pin sob diarrhea )  Patient presents with widespread body aches pains, fever on Monday as well as upper respiratory symptoms and a headache. Patient did go to [facility] on Tuesday had a negative COVID but was started on a Z-Juan Luis. States that the Z-Juan Luis did make her sore throat feel better but still very symptomatic. Patient does have a history of migraines and is on propanolol but states that this headache does not feel the same. She also does report associated neck pain with this. Patient reports having a fever on Monday. Still with symptoms and body aches and pains. Unaware of any COVID illness exposure. No noted vomiting or diarrhea no abdominal pain. ROS: Negative for cp or sob. PE: Gen Appearance/Constitutional: alert  HEENT: NC/NT. PERRLA,  Airway patent. Initial Plan of Care: All treatment areas with department are currently occupied. Plan to order/Initiate the following while awaiting opening in ED: labs, imaging studies, and COVID-19 testing.   Initiate Treatment-Testing, Proceed toTreatment Area When Bed Available for ED Attending/MLP to Continue Care    Electronically signed by BRIAN Lew CNP   DD: 10/13/22       BRIAN Lew CNP  10/13/22 0105

## 2022-10-13 NOTE — ED NOTES
Patient states she feels better and would like to go home now. Dr. Ramu Lockhart notified.      Isaiah Marie RN  10/13/22 0376

## 2022-10-13 NOTE — ED PROVIDER NOTES
HPI:  10/13/22, Time: 8:36 AM EDT         Mary Greenfield is a 51 y.o. female presenting to the ED for sore throat, sinus congestion, headache, nausea, body aches and subjective fever beginning 4 days ago, Monday. Patient reports that she was seen at urgent care 24 hours after onset of symptoms and was provided a Z-Juan Luis, she has 1 day left. She states her sore throat and congestion and fever are better but still has the headache. Patient reports she does get headaches and takes propranolol daily for prophylaxis. she reports this is not the worst headache of her life nor was it sudden and maximal in onset, she does report that this headache is different than her other headaches. The complaint has been persistent, moderate in severity, and worsened by nothing. Patient denies cough, chest pain, shortness of breath, edema, headache, visual disturbances, focal paresthesias, focal weakness, abdominal pain, vomiting, diarrhea, constipation, dysuria, hematuria, trauma, neck or back pain, rash or other complaints. ROS:   A complete review of systems was performed and all pertinent positives and negatives are stated within HPI, all other systems reviewed and are negative.      --------------------------------------------- PAST HISTORY ---------------------------------------------  Past Medical History:  has a past medical history of Anemia, Tension headache, and Vitamin D deficiency disease. Past Surgical History:  has a past surgical history that includes [place] tooth extraction; Ankle surgery; Knee arthroscopy; and Breast surgery (Bilateral). Social History:  reports that she has never smoked. She has never used smokeless tobacco. She reports current alcohol use. She reports that she does not use drugs. Family History: family history includes Prostate Cancer in her maternal grandfather. The patients home medications have been reviewed.     Allergies: Effexor [venlafaxine]        ----------------------------------------PHYSICAL EXAM--------------------------------------  Constitutional:  Well developed, well nourished, no acute distress, non-toxic appearance   Eyes:  PERRL, conjunctiva normal, EOMI  HENT:  Atraumatic, external ears normal, nose normal, oropharynx moist, no pharyngeal exudates swelling but mild redness noted. TMs red and bulging bilaterally. Mastoid tenderness bilaterally. Bilateral external auditory canals pink and dry. . Neck- normal range of motion, no nuchal rigidity, meningismus. Respiratory:  No respiratory distress, normal breath sounds, no rales, no wheezing   Cardiovascular:  Normal rate, normal rhythm, no murmurs, no gallops, no rubs. Radial and DP pulses 2+ bilaterally. Compartments are soft. She is warm and well perfused. Cap refill less than 3 seconds. GI:  Soft, nondistended, normal bowel sounds, nontender, no organomegaly, no mass, no rebound, no guarding   :  No costovertebral angle tenderness   Musculoskeletal:  No edema, no tenderness, no deformities. Back- no tenderness  Integument:  Well hydrated, no rash. Adequate perfusion. Lymphatic: Under bilateral anterior cervical lymphadenopathy noted   Neurologic:  Alert & oriented x 3, CN 2-12 normal, normal motor function, normal sensory function, no focal deficits noted. DTRs 2+ bilateral patellar. Normal gait. Psychiatric:  Speech and behavior appropriate       -------------------------------------------------- RESULTS -------------------------------------------------  I have personally reviewed all laboratory and imaging results for this patient. Results are listed below.      LABS:  Results for orders placed or performed during the hospital encounter of 10/13/22   Rapid influenza A/B antigens    Specimen: Nasopharyngeal   Result Value Ref Range    Influenza A by PCR Not Detected Not Detected    Influenza B by PCR Not Detected Not Detected   COVID-19, Rapid    Specimen: Nasopharyngeal Swab   Result Value Ref Range    SARS-CoV-2, NAAT Not Detected Not Detected   Strep Screen Group A Throat    Specimen: Throat   Result Value Ref Range    Strep Grp A PCR Negative Negative   CBC with Auto Differential   Result Value Ref Range    WBC 8.1 4.5 - 11.5 E9/L    RBC 4.76 3.50 - 5.50 E12/L    Hemoglobin 12.9 11.5 - 15.5 g/dL    Hematocrit 42.1 34.0 - 48.0 %    MCV 88.4 80.0 - 99.9 fL    MCH 27.1 26.0 - 35.0 pg    MCHC 30.6 (L) 32.0 - 34.5 %    RDW 13.7 11.5 - 15.0 fL    Platelets 351 827 - 630 E9/L    MPV 10.5 7.0 - 12.0 fL    Neutrophils % 57.7 43.0 - 80.0 %    Immature Granulocytes % 0.2 0.0 - 5.0 %    Lymphocytes % 26.3 20.0 - 42.0 %    Monocytes % 8.7 2.0 - 12.0 %    Eosinophils % 6.6 (H) 0.0 - 6.0 %    Basophils % 0.5 0.0 - 2.0 %    Neutrophils Absolute 4.65 1.80 - 7.30 E9/L    Immature Granulocytes # 0.02 E9/L    Lymphocytes Absolute 2.12 1.50 - 4.00 E9/L    Monocytes Absolute 0.70 0.10 - 0.95 E9/L    Eosinophils Absolute 0.53 (H) 0.05 - 0.50 E9/L    Basophils Absolute 0.04 0.00 - 0.20 E9/L   Comprehensive Metabolic Panel   Result Value Ref Range    Sodium 142 132 - 146 mmol/L    Potassium 3.8 3.5 - 5.0 mmol/L    Chloride 103 98 - 107 mmol/L    CO2 31 (H) 22 - 29 mmol/L    Anion Gap 8 7 - 16 mmol/L    Glucose 85 74 - 99 mg/dL    BUN 9 6 - 20 mg/dL    Creatinine 1.1 (H) 0.5 - 1.0 mg/dL    GFR Non-African American >60 >=60 mL/min/1.73    GFR African American >60     Calcium 9.5 8.6 - 10.2 mg/dL    Total Protein 7.0 6.4 - 8.3 g/dL    Albumin 3.9 3.5 - 5.2 g/dL    Total Bilirubin <0.2 0.0 - 1.2 mg/dL    Alkaline Phosphatase 95 35 - 104 U/L    ALT 13 0 - 32 U/L    AST 17 0 - 31 U/L   Magnesium   Result Value Ref Range    Magnesium 2.1 1.6 - 2.6 mg/dL   Urinalysis   Result Value Ref Range    Color, UA Yellow Straw/Yellow    Clarity, UA Clear Clear    Glucose, Ur Negative Negative mg/dL    Bilirubin Urine Negative Negative    Ketones, Urine TRACE (A) Negative mg/dL    Specific Gravity, UA 1.020 1.005 - 1.030    Blood, Urine TRACE-INTACT Negative    pH, UA 7.5 5.0 - 9.0    Protein, UA TRACE Negative mg/dL    Urobilinogen, Urine 2.0 (A) <2.0 E.U./dL    Nitrite, Urine Negative Negative    Leukocyte Esterase, Urine Negative Negative   Lactic Acid   Result Value Ref Range    Lactic Acid 0.7 0.5 - 2.2 mmol/L   Microscopic Urinalysis   Result Value Ref Range    Mucus, UA Present (A) None Seen /LPF    WBC, UA 2-5 0 - 5 /HPF    RBC, UA 2-5 0 - 2 /HPF    Bacteria, UA MANY (A) None Seen /HPF   Mononucleosis Screen   Result Value Ref Range    Mono Test Negative Negative       RADIOLOGY:  Interpreted by Radiologist.  CT HEAD WO CONTRAST   Final Result   No acute intracranial abnormality. Paranasal sinus disease. XR CHEST PORTABLE   Final Result   No acute cardiopulmonary process. ------------------------- NURSING NOTES AND VITALS REVIEWED ---------------------------  The nursing notes within the ED encounter and vital signs as below have been reviewed by myself. /89   Pulse 63   Temp 98 °F (36.7 °C) (Oral)   Resp 16   Ht 5' 2\" (1.575 m)   Wt 217 lb (98.4 kg)   LMP 04/12/2013   SpO2 97%   BMI 39.69 kg/m²   Oxygen Saturation Interpretation: Normal      The patients available past medical records and past encounters were reviewed. ------------------------------ ED COURSE/MEDICAL DECISION MAKING----------------------  Medications   acetaminophen (TYLENOL) tablet 1,000 mg (1,000 mg Oral Given 10/13/22 1436)   0.9 % sodium chloride bolus (1,000 mLs IntraVENous New Bag 10/13/22 0828)   ketorolac (TORADOL) injection 30 mg (30 mg IntraVENous Given 10/13/22 0856)   prochlorperazine (COMPAZINE) injection 10 mg (10 mg IntraVENous Given 10/13/22 0856)           Procedures:   none      Medical Decision Making:    10:29 AM EDT  Feels much better. Up and ambulatory without complaint. She is requesting to go home now.   Vital signs are stable, alert and oriented x3, maintains capacity to make decisions, neurovascularly intact and ambulatory upon discharge. Likely viral illness. Patient was explicitly instructed on specific signs and symptoms on which to return to the emergency room for. Patient was instructed to return to the ER for any new or worsening symptoms. Additional discharge instructions were given verbally. All questions were answered. Patient is comfortable and agreeable with discharge plan. Patient in no acute distress and non-toxic in appearance. This patient's ED course included: re-evaluation prior to disposition, IV medications, and a personal history and physicial eaxmination    This patient has remained hemodynamically stable, improved, and been closely monitored during their ED course. Re-Evaluations:  Time: 10:29 AM EDT   Re-evaluation. Patients symptoms are improving  Repeat physical examination is improved      Consultations:   none    Critical Care: none    I, Dina Franklin, , am the Primary Provider of Record    Counseling: The emergency provider has spoken with the patient and discussed todays results, in addition to providing specific details for the plan of care and counseling regarding the diagnosis and prognosis. Questions are answered at this time and they are agreeable with the plan.    --------------------------- IMPRESSION AND DISPOSITION ---------------------------------    IMPRESSION  1. Viral syndrome    2.  Acute nonintractable headache, unspecified headache type        DISPOSITION  Disposition: Discharge to home  Patient condition is stable              Lisseth Morales DO  10/13/22 1030

## 2022-12-29 ENCOUNTER — HOSPITAL ENCOUNTER (OUTPATIENT)
Dept: GENERAL RADIOLOGY | Age: 47
Discharge: HOME | End: 2022-12-31
Payer: OTHER GOVERNMENT

## 2022-12-29 VITALS — HEIGHT: 62 IN | WEIGHT: 213 LBS | BODY MASS INDEX: 39.2 KG/M2

## 2022-12-29 DIAGNOSIS — Z12.31 VISIT FOR SCREENING MAMMOGRAM: ICD-10-CM

## 2022-12-29 PROCEDURE — 77067 SCR MAMMO BI INCL CAD: CPT

## 2023-02-17 ENCOUNTER — APPOINTMENT (OUTPATIENT)
Dept: CT IMAGING | Age: 48
End: 2023-02-17
Payer: COMMERCIAL

## 2023-02-17 ENCOUNTER — HOSPITAL ENCOUNTER (EMERGENCY)
Age: 48
Discharge: HOME | End: 2023-02-17
Payer: COMMERCIAL

## 2023-02-17 VITALS
BODY MASS INDEX: 39.56 KG/M2 | WEIGHT: 215 LBS | SYSTOLIC BLOOD PRESSURE: 144 MMHG | HEIGHT: 62 IN | HEART RATE: 82 BPM | RESPIRATION RATE: 18 BRPM | DIASTOLIC BLOOD PRESSURE: 82 MMHG | TEMPERATURE: 97.2 F | OXYGEN SATURATION: 94 %

## 2023-02-17 DIAGNOSIS — S16.1XXA STRAIN OF NECK MUSCLE, INITIAL ENCOUNTER: ICD-10-CM

## 2023-02-17 DIAGNOSIS — R51.9 NONINTRACTABLE HEADACHE, UNSPECIFIED CHRONICITY PATTERN, UNSPECIFIED HEADACHE TYPE: Primary | ICD-10-CM

## 2023-02-17 DIAGNOSIS — S13.4XXA WHIPLASH INJURY TO NECK, INITIAL ENCOUNTER: ICD-10-CM
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
## 2023-02-17 DIAGNOSIS — S06.0X0A CONCUSSION WITHOUT LOSS OF CONSCIOUSNESS, INITIAL ENCOUNTER: ICD-10-CM

## 2023-02-17 PROCEDURE — 2580000003 HC RX 258: Performed by: NURSE PRACTITIONER

## 2023-02-17 PROCEDURE — 70450 CT HEAD/BRAIN W/O DYE: CPT

## 2023-02-17 PROCEDURE — 99284 EMERGENCY DEPT VISIT MOD MDM: CPT

## 2023-02-17 PROCEDURE — 96374 THER/PROPH/DIAG INJ IV PUSH: CPT

## 2023-02-17 PROCEDURE — 96375 TX/PRO/DX INJ NEW DRUG ADDON: CPT

## 2023-02-17 PROCEDURE — 6360000002 HC RX W HCPCS: Performed by: NURSE PRACTITIONER

## 2023-02-17 PROCEDURE — 72125 CT NECK SPINE W/O DYE: CPT

## 2023-02-17 RX ORDER — METHOCARBAMOL 500 MG/1
500 TABLET, FILM COATED ORAL 4 TIMES DAILY
Qty: 40 TABLET | Refills: 0 | Status: SHIPPED | OUTPATIENT
Start: 2023-02-17 | End: 2023-02-27

## 2023-02-17 RX ORDER — 0.9 % SODIUM CHLORIDE 0.9 %
1000 INTRAVENOUS SOLUTION INTRAVENOUS ONCE
Status: COMPLETED | OUTPATIENT
Start: 2023-02-17 | End: 2023-02-17

## 2023-02-17 RX ORDER — KETOROLAC TROMETHAMINE 30 MG/ML
30 INJECTION, SOLUTION INTRAMUSCULAR; INTRAVENOUS ONCE
Status: COMPLETED | OUTPATIENT
Start: 2023-02-17 | End: 2023-02-17

## 2023-02-17 RX ORDER — ORPHENADRINE CITRATE 30 MG/ML
60 INJECTION INTRAMUSCULAR; INTRAVENOUS ONCE
Status: COMPLETED | OUTPATIENT
Start: 2023-02-17 | End: 2023-02-17

## 2023-02-17 RX ORDER — NAPROXEN 500 MG/1
500 TABLET ORAL 2 TIMES DAILY PRN
Qty: 28 TABLET | Refills: 0 | Status: SHIPPED | OUTPATIENT
Start: 2023-02-17

## 2023-02-17 RX ORDER — DIPHENHYDRAMINE HYDROCHLORIDE 50 MG/ML
25 INJECTION INTRAMUSCULAR; INTRAVENOUS ONCE
Status: COMPLETED | OUTPATIENT
Start: 2023-02-17 | End: 2023-02-17

## 2023-02-17 RX ORDER — METOCLOPRAMIDE HYDROCHLORIDE 5 MG/ML
10 INJECTION INTRAMUSCULAR; INTRAVENOUS ONCE
Status: COMPLETED | OUTPATIENT
Start: 2023-02-17 | End: 2023-02-17

## 2023-02-17 RX ORDER — ONDANSETRON 4 MG/1
4 TABLET, FILM COATED ORAL EVERY 8 HOURS PRN
Qty: 12 TABLET | Refills: 0 | Status: SHIPPED | OUTPATIENT
Start: 2023-02-17 | End: 2023-02-22

## 2023-02-17 RX ADMIN — ORPHENADRINE CITRATE 60 MG: 30 INJECTION INTRAMUSCULAR; INTRAVENOUS at 22:25

## 2023-02-17 RX ADMIN — SODIUM CHLORIDE 1000 ML: 9 INJECTION, SOLUTION INTRAVENOUS at 20:53

## 2023-02-17 RX ADMIN — KETOROLAC TROMETHAMINE 30 MG: 30 INJECTION, SOLUTION INTRAMUSCULAR; INTRAVENOUS at 22:25

## 2023-02-17 RX ADMIN — DIPHENHYDRAMINE HYDROCHLORIDE 25 MG: 50 INJECTION, SOLUTION INTRAMUSCULAR; INTRAVENOUS at 20:54

## 2023-02-17 RX ADMIN — METOCLOPRAMIDE 10 MG: 5 INJECTION, SOLUTION INTRAMUSCULAR; INTRAVENOUS at 20:54

## 2023-02-17 ASSESSMENT — PAIN - FUNCTIONAL ASSESSMENT: PAIN_FUNCTIONAL_ASSESSMENT: 0-10

## 2023-02-17 ASSESSMENT — PAIN DESCRIPTION - DESCRIPTORS: DESCRIPTORS: ACHING;GNAWING;NAGGING

## 2023-02-17 ASSESSMENT — PAIN DESCRIPTION - LOCATION: LOCATION: HEAD

## 2023-02-17 ASSESSMENT — PAIN SCALES - GENERAL
PAINLEVEL_OUTOF10: 8
PAINLEVEL_OUTOF10: 4

## 2023-02-17 NOTE — Clinical Note
Sung Tolentino was seen and treated in our emergency department on 2/17/2023. She may return to work on 02/21/2023. If you have any questions or concerns, please don't hesitate to call.       Merline Salm, BRIAN - CNP
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
## 2023-02-18 NOTE — DISCHARGE INSTRUCTIONS
Perform Cognitive rest for the next 3-5 days, Avoid cell phone, screen time and computer time,and take meds for symptom relief

## 2023-02-18 NOTE — ED NOTES
Pt's mother came back to pt's room. This RN was administering medications and answering questions for the pt in another room. Pt's mother kept interrupting this RN and coming into the hallway to ask questions. This RN asked the pt's mom to stay in the room and that I was coming to see them next. Pt's mother stated \"you must not know I am a dialysis nurse here and work as a NP in the outside world\". This RN assured her that I will be with her when this RN is done with another patient.       Constanza Chang RN  02/17/23 [id number]

## 2023-02-21 ENCOUNTER — HOSPITAL ENCOUNTER (EMERGENCY)
Age: 48
Discharge: HOME | End: 2023-02-21
Payer: COMMERCIAL

## 2023-02-21 ENCOUNTER — APPOINTMENT (OUTPATIENT)
Dept: CT IMAGING | Age: 48
End: 2023-02-21
Payer: COMMERCIAL

## 2023-02-21 VITALS
SYSTOLIC BLOOD PRESSURE: 147 MMHG | OXYGEN SATURATION: 97 % | DIASTOLIC BLOOD PRESSURE: 94 MMHG | TEMPERATURE: 97.6 F | HEART RATE: 71 BPM | BODY MASS INDEX: 39.32 KG/M2 | RESPIRATION RATE: 18 BRPM | WEIGHT: 215 LBS
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
## 2023-02-21 DIAGNOSIS — G44.309 POST-CONCUSSION HEADACHE: Primary | ICD-10-CM

## 2023-02-21 DIAGNOSIS — R11.0 NAUSEA: ICD-10-CM

## 2023-02-21 LAB
ALBUMIN SERPL-MCNC: 4 G/DL (ref 3.5–5.2)
ALP BLD-CCNC: 88 U/L (ref 35–104)
ALT SERPL-CCNC: 20 U/L (ref 0–32)
ANION GAP SERPL CALCULATED.3IONS-SCNC: 6 MMOL/L (ref 7–16)
AST SERPL-CCNC: 24 U/L (ref 0–31)
BASOPHILS ABSOLUTE: 0.05 E9/L (ref 0–0.2)
BASOPHILS RELATIVE PERCENT: 0.7 % (ref 0–2)
BILIRUB SERPL-MCNC: 0.3 MG/DL (ref 0–1.2)
BUN BLDV-MCNC: 9 MG/DL (ref 6–20)
CALCIUM SERPL-MCNC: 9.4 MG/DL (ref 8.6–10.2)
CHLORIDE BLD-SCNC: 106 MMOL/L (ref 98–107)
CO2: 29 MMOL/L (ref 22–29)
CREAT SERPL-MCNC: 1 MG/DL (ref 0.5–1)
EOSINOPHILS ABSOLUTE: 0.7 E9/L (ref 0.05–0.5)
EOSINOPHILS RELATIVE PERCENT: 9.9 % (ref 0–6)
GFR SERPL CREATININE-BSD FRML MDRD: >60 ML/MIN/1.73
GLUCOSE BLD-MCNC: 87 MG/DL (ref 74–99)
HCT VFR BLD CALC: 39.6 % (ref 34–48)
HEMOGLOBIN: 12.6 G/DL (ref 11.5–15.5)
IMMATURE GRANULOCYTES #: 0.02 E9/L
IMMATURE GRANULOCYTES %: 0.3 % (ref 0–5)
LYMPHOCYTES ABSOLUTE: 2.25 E9/L (ref 1.5–4)
LYMPHOCYTES RELATIVE PERCENT: 31.7 % (ref 20–42)
MCH RBC QN AUTO: 27.1 PG (ref 26–35)
MCHC RBC AUTO-ENTMCNC: 31.8 % (ref 32–34.5)
MCV RBC AUTO: 85.2 FL (ref 80–99.9)
MONOCYTES ABSOLUTE: 0.52 E9/L (ref 0.1–0.95)
MONOCYTES RELATIVE PERCENT: 7.3 % (ref 2–12)
NEUTROPHILS ABSOLUTE: 3.55 E9/L (ref 1.8–7.3)
NEUTROPHILS RELATIVE PERCENT: 50.1 % (ref 43–80)
PDW BLD-RTO: 13.9 FL (ref 11.5–15)
PLATELET # BLD: 271 E9/L (ref 130–450)
PMV BLD AUTO: 9.7 FL (ref 7–12)
POTASSIUM SERPL-SCNC: 4.2 MMOL/L (ref 3.5–5)
RBC # BLD: 4.65 E12/L (ref 3.5–5.5)
SODIUM BLD-SCNC: 141 MMOL/L (ref 132–146)
TOTAL PROTEIN: 7.2 G/DL (ref 6.4–8.3)
WBC # BLD: 7.1 E9/L (ref 4.5–11.5)

## 2023-02-21 PROCEDURE — 6370000000 HC RX 637 (ALT 250 FOR IP): Performed by: PHYSICIAN ASSISTANT

## 2023-02-21 PROCEDURE — 36415 COLL VENOUS BLD VENIPUNCTURE: CPT

## 2023-02-21 PROCEDURE — 96372 THER/PROPH/DIAG INJ SC/IM: CPT

## 2023-02-21 PROCEDURE — 85025 COMPLETE CBC W/AUTO DIFF WBC: CPT

## 2023-02-21 PROCEDURE — 80053 COMPREHEN METABOLIC PANEL: CPT

## 2023-02-21 PROCEDURE — 6360000002 HC RX W HCPCS: Performed by: NURSE PRACTITIONER

## 2023-02-21 PROCEDURE — 99284 EMERGENCY DEPT VISIT MOD MDM: CPT

## 2023-02-21 PROCEDURE — 70450 CT HEAD/BRAIN W/O DYE: CPT

## 2023-02-21 RX ORDER — ONDANSETRON 4 MG/1
4 TABLET, ORALLY DISINTEGRATING ORAL ONCE
Status: COMPLETED | OUTPATIENT
Start: 2023-02-21 | End: 2023-02-21

## 2023-02-21 RX ORDER — KETOROLAC TROMETHAMINE 30 MG/ML
30 INJECTION, SOLUTION INTRAMUSCULAR; INTRAVENOUS ONCE
Status: COMPLETED | OUTPATIENT
Start: 2023-02-21 | End: 2023-02-21

## 2023-02-21 RX ADMIN — ONDANSETRON 4 MG: 4 TABLET, ORALLY DISINTEGRATING ORAL at 12:31

## 2023-02-21 RX ADMIN — KETOROLAC TROMETHAMINE 30 MG: 30 INJECTION, SOLUTION INTRAMUSCULAR at 13:42

## 2023-02-21 ASSESSMENT — PAIN - FUNCTIONAL ASSESSMENT: PAIN_FUNCTIONAL_ASSESSMENT: 0-10

## 2023-02-21 ASSESSMENT — PAIN SCALES - GENERAL: PAINLEVEL_OUTOF10: 8

## 2023-02-21 ASSESSMENT — LIFESTYLE VARIABLES
HOW OFTEN DO YOU HAVE A DRINK CONTAINING ALCOHOL: NEVER
HOW MANY STANDARD DRINKS CONTAINING ALCOHOL DO YOU HAVE ON A TYPICAL DAY: PATIENT DOES NOT DRINK

## 2023-02-21 NOTE — Clinical Note
Savanna Malik was seen and treated in our emergency department on 2/21/2023. She may return to work on 02/25/2023. If you have any questions or concerns, please don't hesitate to call.       Forrest Quispe, BRIAN - CNP
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
## 2023-04-15 ENCOUNTER — HOSPITAL ENCOUNTER (OUTPATIENT)
Dept: ULTRASOUND IMAGING | Age: 48
Discharge: HOME | End: 2023-04-17
Payer: COMMERCIAL

## 2023-04-15 ENCOUNTER — HOSPITAL ENCOUNTER (OUTPATIENT)
Age: 48
Discharge: HOME | End: 2023-04-17
Payer: COMMERCIAL

## 2023-04-15 DIAGNOSIS — M79.604 PAIN IN RIGHT LEG: ICD-10-CM

## 2023-04-15 PROCEDURE — 93971 EXTREMITY STUDY: CPT

## 2023-12-05 ENCOUNTER — TELEPHONE (OUTPATIENT)
Dept: GENERAL RADIOLOGY | Age: 48
End: 2023-12-05

## 2024-01-02 ENCOUNTER — HOSPITAL ENCOUNTER (OUTPATIENT)
Dept: GENERAL RADIOLOGY | Age: 49
Discharge: HOME | End: 2024-01-04
Payer: OTHER GOVERNMENT

## 2024-01-02 VITALS — WEIGHT: 213 LBS | BODY MASS INDEX: 39.2 KG/M2 | HEIGHT: 62 IN

## 2024-01-02 DIAGNOSIS — Z12.31 OTHER SCREENING MAMMOGRAM: ICD-10-CM

## 2024-01-02 PROCEDURE — 77063 BREAST TOMOSYNTHESIS BI: CPT

## 2025-01-13 ENCOUNTER — TRANSCRIBE ORDERS (OUTPATIENT)
Dept: ADMINISTRATIVE | Age: 50
End: 2025-01-13

## 2025-01-13 DIAGNOSIS — R31.9 HEMATURIA, UNSPECIFIED TYPE: Primary | ICD-10-CM

## 2025-01-31 ENCOUNTER — HOSPITAL ENCOUNTER (OUTPATIENT)
Dept: GENERAL RADIOLOGY | Age: 50
Discharge: HOME | End: 2025-01-31
Payer: OTHER GOVERNMENT

## 2025-01-31 DIAGNOSIS — Z12.31 VISIT FOR SCREENING MAMMOGRAM: ICD-10-CM

## 2025-01-31 PROCEDURE — 77063 BREAST TOMOSYNTHESIS BI: CPT

## 2025-02-06 ENCOUNTER — TELEPHONE (OUTPATIENT)
Dept: GENERAL RADIOLOGY | Age: 50
End: 2025-02-06

## 2025-02-06 NOTE — TELEPHONE ENCOUNTER
Patient reached out to schedule additional imaging. Faxed for orders to Vashti Torres NP twice. Once orders receive, scheduling will call patient.

## 2025-02-07 ENCOUNTER — HOSPITAL ENCOUNTER (OUTPATIENT)
Dept: CT IMAGING | Age: 50
Discharge: HOME | End: 2025-02-09
Payer: OTHER GOVERNMENT

## 2025-02-07 DIAGNOSIS — R31.9 HEMATURIA SYNDROME: ICD-10-CM

## 2025-02-07 DIAGNOSIS — R31.9 HEMATURIA, UNSPECIFIED TYPE: ICD-10-CM

## 2025-02-07 PROCEDURE — 6360000004 HC RX CONTRAST MEDICATION: Performed by: RADIOLOGY

## 2025-02-07 PROCEDURE — 76376 3D RENDER W/INTRP POSTPROCES: CPT

## 2025-02-07 PROCEDURE — 74178 CT ABD&PLV WO CNTR FLWD CNTR: CPT | Performed by: NURSE PRACTITIONER

## 2025-02-07 RX ORDER — SODIUM CHLORIDE 0.9 % (FLUSH) 0.9 %
10 SYRINGE (ML) INJECTION
Status: ACTIVE | OUTPATIENT
Start: 2025-02-07 | End: 2025-02-08

## 2025-02-07 RX ORDER — IOPAMIDOL 755 MG/ML
110 INJECTION, SOLUTION INTRAVASCULAR
Status: COMPLETED | OUTPATIENT
Start: 2025-02-07 | End: 2025-02-07

## 2025-02-07 RX ADMIN — IOPAMIDOL 110 ML: 755 INJECTION, SOLUTION INTRAVENOUS at 17:20

## 2025-03-11 ENCOUNTER — APPOINTMENT (OUTPATIENT)
Dept: ULTRASOUND IMAGING | Age: 50
DRG: 418 | End: 2025-03-11
Payer: OTHER GOVERNMENT

## 2025-03-11 ENCOUNTER — APPOINTMENT (OUTPATIENT)
Dept: GENERAL RADIOLOGY | Age: 50
DRG: 418 | End: 2025-03-11
Payer: OTHER GOVERNMENT

## 2025-03-11 ENCOUNTER — APPOINTMENT (OUTPATIENT)
Dept: CT IMAGING | Age: 50
DRG: 418 | End: 2025-03-11
Payer: OTHER GOVERNMENT

## 2025-03-11 ENCOUNTER — ANESTHESIA EVENT (OUTPATIENT)
Dept: OPERATING ROOM | Age: 50
DRG: 418 | End: 2025-03-11
Payer: OTHER GOVERNMENT

## 2025-03-11 ENCOUNTER — HOSPITAL ENCOUNTER (INPATIENT)
Age: 50
LOS: 2 days | Discharge: HOME | DRG: 418 | End: 2025-03-13
Attending: EMERGENCY MEDICINE | Admitting: SURGERY
Payer: OTHER GOVERNMENT

## 2025-03-11 DIAGNOSIS — K81.0 ACUTE CHOLECYSTITIS: Primary | ICD-10-CM

## 2025-03-11 PROBLEM — E66.01 MORBID OBESITY WITH BMI OF 40.0-44.9, ADULT: Status: ACTIVE | Noted: 2025-03-11

## 2025-03-11 LAB
ALBUMIN SERPL-MCNC: 4.6 G/DL (ref 3.5–5.2)
ALP SERPL-CCNC: 111 U/L (ref 35–104)
ALT SERPL-CCNC: 38 U/L (ref 0–32)
ANION GAP SERPL CALCULATED.3IONS-SCNC: 12 MMOL/L (ref 7–16)
AST SERPL-CCNC: 30 U/L (ref 0–31)
BASOPHILS # BLD: 0.03 K/UL (ref 0–0.2)
BASOPHILS NFR BLD: 0 % (ref 0–2)
BILIRUB SERPL-MCNC: 0.3 MG/DL (ref 0–1.2)
BUN SERPL-MCNC: 11 MG/DL (ref 6–20)
CALCIUM SERPL-MCNC: 9.8 MG/DL (ref 8.6–10.2)
CHLORIDE SERPL-SCNC: 105 MMOL/L (ref 98–107)
CO2 SERPL-SCNC: 25 MMOL/L (ref 22–29)
CREAT SERPL-MCNC: 1 MG/DL (ref 0.5–1)
EKG ATRIAL RATE: 64 BPM
EKG P AXIS: 21 DEGREES
EKG P-R INTERVAL: 194 MS
EKG Q-T INTERVAL: 440 MS
EKG QRS DURATION: 92 MS
EKG QTC CALCULATION (BAZETT): 453 MS
EKG R AXIS: 30 DEGREES
EKG T AXIS: 41 DEGREES
EKG VENTRICULAR RATE: 64 BPM
EOSINOPHIL # BLD: 0.11 K/UL (ref 0.05–0.5)
EOSINOPHILS RELATIVE PERCENT: 1 % (ref 0–6)
ERYTHROCYTE [DISTWIDTH] IN BLOOD BY AUTOMATED COUNT: 17 % (ref 11.5–15)
GFR, ESTIMATED: 71 ML/MIN/1.73M2
GLUCOSE SERPL-MCNC: 139 MG/DL (ref 74–99)
HCT VFR BLD AUTO: 42.1 % (ref 34–48)
HGB BLD-MCNC: 13 G/DL (ref 11.5–15.5)
IMM GRANULOCYTES # BLD AUTO: 0.04 K/UL (ref 0–0.58)
IMM GRANULOCYTES NFR BLD: 1 % (ref 0–5)
LIPASE SERPL-CCNC: 30 U/L (ref 13–60)
LYMPHOCYTES NFR BLD: 1.01 K/UL (ref 1.5–4)
LYMPHOCYTES RELATIVE PERCENT: 12 % (ref 20–42)
MCH RBC QN AUTO: 26.2 PG (ref 26–35)
MCHC RBC AUTO-ENTMCNC: 30.9 G/DL (ref 32–34.5)
MCV RBC AUTO: 84.9 FL (ref 80–99.9)
MONOCYTES NFR BLD: 0.35 K/UL (ref 0.1–0.95)
MONOCYTES NFR BLD: 4 % (ref 2–12)
NEUTROPHILS NFR BLD: 82 % (ref 43–80)
NEUTS SEG NFR BLD: 7.09 K/UL (ref 1.8–7.3)
PLATELET # BLD AUTO: 289 K/UL (ref 130–450)
PMV BLD AUTO: 10.2 FL (ref 7–12)
POTASSIUM SERPL-SCNC: 4 MMOL/L (ref 3.5–5)
PROT SERPL-MCNC: 7.7 G/DL (ref 6.4–8.3)
RBC # BLD AUTO: 4.96 M/UL (ref 3.5–5.5)
SODIUM SERPL-SCNC: 142 MMOL/L (ref 132–146)
TROPONIN I SERPL HS-MCNC: <6 NG/L (ref 0–9)
WBC OTHER # BLD: 8.6 K/UL (ref 4.5–11.5)

## 2025-03-11 PROCEDURE — 1200000000 HC SEMI PRIVATE

## 2025-03-11 PROCEDURE — 93010 ELECTROCARDIOGRAM REPORT: CPT | Performed by: INTERNAL MEDICINE

## 2025-03-11 PROCEDURE — 80053 COMPREHEN METABOLIC PANEL: CPT

## 2025-03-11 PROCEDURE — 2500000003 HC RX 250 WO HCPCS

## 2025-03-11 PROCEDURE — 6360000002 HC RX W HCPCS

## 2025-03-11 PROCEDURE — 6370000000 HC RX 637 (ALT 250 FOR IP)

## 2025-03-11 PROCEDURE — 85025 COMPLETE CBC W/AUTO DIFF WBC: CPT

## 2025-03-11 PROCEDURE — 76705 ECHO EXAM OF ABDOMEN: CPT

## 2025-03-11 PROCEDURE — 99285 EMERGENCY DEPT VISIT HI MDM: CPT

## 2025-03-11 PROCEDURE — 96374 THER/PROPH/DIAG INJ IV PUSH: CPT

## 2025-03-11 PROCEDURE — 93005 ELECTROCARDIOGRAM TRACING: CPT

## 2025-03-11 PROCEDURE — 74177 CT ABD & PELVIS W/CONTRAST: CPT

## 2025-03-11 PROCEDURE — 99223 1ST HOSP IP/OBS HIGH 75: CPT | Performed by: SURGERY

## 2025-03-11 PROCEDURE — 2580000003 HC RX 258

## 2025-03-11 PROCEDURE — 84484 ASSAY OF TROPONIN QUANT: CPT

## 2025-03-11 PROCEDURE — 83690 ASSAY OF LIPASE: CPT

## 2025-03-11 PROCEDURE — 94640 AIRWAY INHALATION TREATMENT: CPT

## 2025-03-11 PROCEDURE — 71046 X-RAY EXAM CHEST 2 VIEWS: CPT

## 2025-03-11 PROCEDURE — 6360000004 HC RX CONTRAST MEDICATION: Performed by: RADIOLOGY

## 2025-03-11 PROCEDURE — 6370000000 HC RX 637 (ALT 250 FOR IP): Performed by: EMERGENCY MEDICINE

## 2025-03-11 RX ORDER — METRONIDAZOLE 500 MG/100ML
500 INJECTION, SOLUTION INTRAVENOUS ONCE
Status: COMPLETED | OUTPATIENT
Start: 2025-03-11 | End: 2025-03-11

## 2025-03-11 RX ORDER — SODIUM CHLORIDE, SODIUM LACTATE, POTASSIUM CHLORIDE, CALCIUM CHLORIDE 600; 310; 30; 20 MG/100ML; MG/100ML; MG/100ML; MG/100ML
INJECTION, SOLUTION INTRAVENOUS CONTINUOUS
Status: DISCONTINUED | OUTPATIENT
Start: 2025-03-12 | End: 2025-03-12

## 2025-03-11 RX ORDER — SODIUM CHLORIDE 0.9 % (FLUSH) 0.9 %
10 SYRINGE (ML) INJECTION PRN
Status: DISCONTINUED | OUTPATIENT
Start: 2025-03-11 | End: 2025-03-13 | Stop reason: HOSPADM

## 2025-03-11 RX ORDER — SODIUM CHLORIDE 9 MG/ML
INJECTION, SOLUTION INTRAVENOUS PRN
Status: DISCONTINUED | OUTPATIENT
Start: 2025-03-11 | End: 2025-03-13 | Stop reason: HOSPADM

## 2025-03-11 RX ORDER — ENOXAPARIN SODIUM 100 MG/ML
30 INJECTION SUBCUTANEOUS 2 TIMES DAILY
Status: DISCONTINUED | OUTPATIENT
Start: 2025-03-11 | End: 2025-03-13 | Stop reason: HOSPADM

## 2025-03-11 RX ORDER — IOPAMIDOL 755 MG/ML
75 INJECTION, SOLUTION INTRAVASCULAR
Status: COMPLETED | OUTPATIENT
Start: 2025-03-11 | End: 2025-03-11

## 2025-03-11 RX ORDER — ONDANSETRON 4 MG/1
4 TABLET, ORALLY DISINTEGRATING ORAL EVERY 8 HOURS PRN
Status: DISCONTINUED | OUTPATIENT
Start: 2025-03-11 | End: 2025-03-13 | Stop reason: HOSPADM

## 2025-03-11 RX ORDER — ACETAMINOPHEN 325 MG/1
650 TABLET ORAL EVERY 6 HOURS SCHEDULED
Status: DISCONTINUED | OUTPATIENT
Start: 2025-03-11 | End: 2025-03-13 | Stop reason: HOSPADM

## 2025-03-11 RX ORDER — ONDANSETRON 2 MG/ML
4 INJECTION INTRAMUSCULAR; INTRAVENOUS EVERY 6 HOURS PRN
Status: DISCONTINUED | OUTPATIENT
Start: 2025-03-11 | End: 2025-03-13 | Stop reason: HOSPADM

## 2025-03-11 RX ORDER — VERAPAMIL HYDROCHLORIDE 120 MG/1
120 TABLET, FILM COATED, EXTENDED RELEASE ORAL NIGHTLY
Status: DISCONTINUED | OUTPATIENT
Start: 2025-03-11 | End: 2025-03-13 | Stop reason: HOSPADM

## 2025-03-11 RX ORDER — OXYCODONE HYDROCHLORIDE 5 MG/1
5 TABLET ORAL EVERY 4 HOURS PRN
Status: DISCONTINUED | OUTPATIENT
Start: 2025-03-11 | End: 2025-03-13 | Stop reason: HOSPADM

## 2025-03-11 RX ORDER — FERROUS SULFATE 325(65) MG
325 TABLET ORAL
Status: DISCONTINUED | OUTPATIENT
Start: 2025-03-12 | End: 2025-03-13 | Stop reason: HOSPADM

## 2025-03-11 RX ORDER — OXYCODONE HYDROCHLORIDE 10 MG/1
10 TABLET ORAL EVERY 4 HOURS PRN
Status: DISCONTINUED | OUTPATIENT
Start: 2025-03-11 | End: 2025-03-13 | Stop reason: HOSPADM

## 2025-03-11 RX ORDER — PROPRANOLOL HYDROCHLORIDE 10 MG/1
10 TABLET ORAL NIGHTLY
COMMUNITY
Start: 2024-07-03

## 2025-03-11 RX ORDER — OXYCODONE AND ACETAMINOPHEN 5; 325 MG/1; MG/1
2 TABLET ORAL ONCE
Refills: 0 | Status: COMPLETED | OUTPATIENT
Start: 2025-03-11 | End: 2025-03-11

## 2025-03-11 RX ORDER — SODIUM CHLORIDE 0.9 % (FLUSH) 0.9 %
10 SYRINGE (ML) INJECTION EVERY 12 HOURS SCHEDULED
Status: DISCONTINUED | OUTPATIENT
Start: 2025-03-11 | End: 2025-03-13 | Stop reason: HOSPADM

## 2025-03-11 RX ORDER — PROPRANOLOL HYDROCHLORIDE 10 MG/1
10 TABLET ORAL NIGHTLY
Status: DISCONTINUED | OUTPATIENT
Start: 2025-03-11 | End: 2025-03-13 | Stop reason: HOSPADM

## 2025-03-11 RX ORDER — ONDANSETRON 2 MG/ML
4 INJECTION INTRAMUSCULAR; INTRAVENOUS ONCE
Status: COMPLETED | OUTPATIENT
Start: 2025-03-11 | End: 2025-03-11

## 2025-03-11 RX ORDER — METRONIDAZOLE 500 MG/100ML
500 INJECTION, SOLUTION INTRAVENOUS EVERY 8 HOURS
Status: DISCONTINUED | OUTPATIENT
Start: 2025-03-12 | End: 2025-03-12

## 2025-03-11 RX ADMIN — ONDANSETRON 4 MG: 2 INJECTION, SOLUTION INTRAMUSCULAR; INTRAVENOUS at 07:36

## 2025-03-11 RX ADMIN — SODIUM CHLORIDE, SODIUM LACTATE, POTASSIUM CHLORIDE, AND CALCIUM CHLORIDE: .6; .31; .03; .02 INJECTION, SOLUTION INTRAVENOUS at 23:45

## 2025-03-11 RX ADMIN — ACETAMINOPHEN 650 MG: 325 TABLET ORAL at 23:47

## 2025-03-11 RX ADMIN — OXYCODONE AND ACETAMINOPHEN 2 TABLET: 325; 5 TABLET ORAL at 11:37

## 2025-03-11 RX ADMIN — ARFORMOTEROL TARTRATE: 15 SOLUTION RESPIRATORY (INHALATION) at 21:39

## 2025-03-11 RX ADMIN — SODIUM CHLORIDE, PRESERVATIVE FREE 10 ML: 5 INJECTION INTRAVENOUS at 20:34

## 2025-03-11 RX ADMIN — METRONIDAZOLE 500 MG: 500 INJECTION, SOLUTION INTRAVENOUS at 15:49

## 2025-03-11 RX ADMIN — IOPAMIDOL 75 ML: 755 INJECTION, SOLUTION INTRAVENOUS at 08:15

## 2025-03-11 RX ADMIN — PROPRANOLOL HYDROCHLORIDE 10 MG: 10 TABLET ORAL at 23:47

## 2025-03-11 RX ADMIN — METRONIDAZOLE 500 MG: 500 INJECTION, SOLUTION INTRAVENOUS at 23:46

## 2025-03-11 RX ADMIN — VERAPAMIL HYDROCHLORIDE 120 MG: 120 TABLET, FILM COATED, EXTENDED RELEASE ORAL at 23:47

## 2025-03-11 RX ADMIN — ACETAMINOPHEN 650 MG: 325 TABLET ORAL at 18:18

## 2025-03-11 RX ADMIN — WATER 2000 MG: 1 INJECTION INTRAMUSCULAR; INTRAVENOUS; SUBCUTANEOUS at 15:38

## 2025-03-11 RX ADMIN — LIDOCAINE HYDROCHLORIDE: 20 SOLUTION ORAL at 06:57

## 2025-03-11 ASSESSMENT — PAIN DESCRIPTION - ORIENTATION
ORIENTATION: UPPER
ORIENTATION: RIGHT;LEFT;MID
ORIENTATION: LOWER;RIGHT

## 2025-03-11 ASSESSMENT — PAIN - FUNCTIONAL ASSESSMENT
PAIN_FUNCTIONAL_ASSESSMENT: ACTIVITIES ARE NOT PREVENTED
PAIN_FUNCTIONAL_ASSESSMENT: 0-10

## 2025-03-11 ASSESSMENT — PAIN DESCRIPTION - PAIN TYPE: TYPE: ACUTE PAIN

## 2025-03-11 ASSESSMENT — PAIN DESCRIPTION - DESCRIPTORS
DESCRIPTORS: SHARP;SHOOTING
DESCRIPTORS: ACHING
DESCRIPTORS: ACHING;DISCOMFORT

## 2025-03-11 ASSESSMENT — PAIN DESCRIPTION - LOCATION
LOCATION: ABDOMEN

## 2025-03-11 ASSESSMENT — PAIN SCALES - GENERAL
PAINLEVEL_OUTOF10: 3
PAINLEVEL_OUTOF10: 4
PAINLEVEL_OUTOF10: 7
PAINLEVEL_OUTOF10: 5

## 2025-03-11 ASSESSMENT — PAIN DESCRIPTION - FREQUENCY: FREQUENCY: CONTINUOUS

## 2025-03-11 NOTE — ED NOTES
Patient states after she received the iodine during CT she began having itchy throat and sneezing , patient states symptoms has now resolved and patient refused Dr Asif cherry updated

## 2025-03-11 NOTE — PROGRESS NOTES
4 Eyes Skin Assessment     NAME:  Eileen Bhakta  YOB: 1975  MEDICAL RECORD NUMBER:  [id number]    The patient is being assessed for  Admission    I agree that at least one RN has performed a thorough Head to Toe Skin Assessment on the patient. ALL assessment sites listed below have been assessed.      Areas assessed by both nurses:    Head, Face, Ears, Shoulders, Back, Chest, Arms, Elbows, Hands, Sacrum. Buttock, Coccyx, Ischium, Legs. Feet and Heels, and Under Medical Devices         Does the Patient have a Wound? No noted wound(s)       Damion Prevention initiated by RN: Yes  Wound Care Orders initiated by RN: No    Pressure Injury (Stage 3,4, Unstageable, DTI, NWPT, and Complex wounds) if present, place Wound referral order by RN under : No    New Ostomies, if present place, Ostomy referral order under : No     Nurse 1 eSignature: Electronically signed by Izabela Newton RN on 3/11/25 at 4:57 PM EDT    **SHARE this note so that the co-signing nurse can place an eSignature**    Nurse 2 eSignature: Electronically signed by Sharon Beck RN on 3/11/25 at 5:00 PM EDT

## 2025-03-11 NOTE — H&P
General Surgery   History and Physical       Patient's Name/Date of Birth: Eileen Bhakta / 1975    Date: March 11, 2025     PCP: Vashti Torres, BRIAN - JANICE     Chief Complaint:   Chief Complaint   Patient presents with    Nausea     And vomiting at 0200. Woke her up then pt states that after that across her upper abd started to hurt. No diarrhea        HPI:   Eileen Bhakta is a 49 y.o. female who presents for evaluation of nausea and emesis that started at 2 AM today.  Reports she ate behaview [employer] for dinner yesterday.  Patient states she woke up due to her nausea ended up having 4 episodes of emesis.  After that patient started having severe upper abdominal pain that only subsided with medications in the ED.  Reports associated diaphoresis.  Denies fevers, chills, jaundice, constipation, diarrhea, acholic stool, pruritus, dark-colored urine.  Denies similar symptoms in the past.  Denies postprandial pain after greasy food.  Denies unintentional weight loss (lost 15 pounds in the past 2 weeks due to increased physical activity).  General surgery was consulted for concerns of acute cholecystitis.    PMHx includes asthma, anemia, HTN, migraines.  SHX includes abdominoplasty in [place] 2011, hysterectomy 2017, oophorectomy 2019.  Reports she had an EGD and colonoscopy on 3/5  that were negative.  FHx includes bladder cancer (mother), prostate cancer (MGF).  Denies smoking or drug use.  Drinks 1 glass of wine every 2-3 months.    In the ED patient is afebrile hemodynamically stable.  Labs reviewed showed alk phos 111, ALT 38, WBC 8.6 with a left shift 82, Hgb 13, platelets 289.  CTAP showed thickened gallbladder wall with pericholecystic fluid.  RUQ US showed gallbladder wall thickening, stones, pericholecystic fluid.     Patient Active Problem List   Diagnosis    Acute cholecystitis       Allergies   Allergen Reactions    Effexor [Venlafaxine]        Past Medical History:   Diagnosis Date    Anemia      Immunology ROS: negative itchy/watery eyes or nasal congestion  Hematological and Lymphatic ROS: negative spontaneous bleeding or bruising  Endocrine ROS: negative  lethargy, mood swings, palpitations or polydipsia/polyuria  Respiratory ROS: negative sputum changes, stridor, tachypnea or wheezing  Cardiovascular ROS: negative for - loss of consciousness, murmur or orthopnea  Gastrointestinal ROS: negative for - hematochezia or hematemesis  Genito-Urinary ROS: negative for -  genital discharge or hematuria  Musculoskeletal ROS: negative for - focal weakness, gangrene      Physical exam:   /89   Pulse 78   Temp 97.3 °F (36.3 °C)   Resp 17   Ht 1.575 m (5' 2\")   Wt 106.6 kg (235 lb)   LMP 04/12/2013   SpO2 98%   BMI 42.98 kg/m²   General appearance:  lying in bed, NAD  Head: NCAT, EOMI   Neck: supple, no masses, trachea midline  Lungs: symmetric chest rise, no audible wheezes  Heart: warm throughout  Abdomen: soft, non-distended, diffuse moderate TTP with severe TTP at the RUQ, positive Rodríguez sign  Skin: warm and dry  Extremities: atraumatic, no focal motor deficits      Assessment:  Eileen Bhakta is a 49 y.o. female with acute cholecystitis    Plan:  -Will admit patient  -Okay for CLD today  -N.p.o. at midnight okay for sips with meds, IVF at [place]   -Rocephin and Flagyl ordered  -Plan for laparoscopic cholecystectomy tomorrow with Dr. Kay   -Pain and nausea control.    Discussed with Dr. Rahul Bejarano MD  PGY-5 General Surgery Resident

## 2025-03-11 NOTE — ED NOTES
THIS NURSE CALLED PT SEVERAL TIMES TO MEDICATE, PT NOT IN WAITING ROOM OR ER BATHROOM, PT AT ULTRASOUND , WILL ATTEMPT AGAIN IN LITTLE WHILE.

## 2025-03-11 NOTE — ED PROVIDER NOTES
[facility] EMERGENCY DEPARTMENT  EMERGENCY DEPARTMENT ENCOUNTER        Pt Name: Eileen Bhakta  MRN: [id number]  Birthdate 1975  Date of evaluation: 3/11/2025  Provider: Monse Yang MD  Attending Provider: Shankar Kay MD  PCP: Vashti Torres, BRIAN - CNP  Note Started: 5:50 AM EDT 3/11/25    CHIEF COMPLAINT       Chief Complaint   Patient presents with    Nausea     And vomiting at 0200. Woke her up then pt states that after that across her upper abd started to hurt. No diarrhea          HISTORY OF PRESENT ILLNESS: 1 or more Elements   History From: The patient        Eileen Bhakta is a 49 y.o. female with a past medical history of anemia presenting with nausea and vomiting.  Patient states that she woke up at 0200 with nausea and vomiting that is not black or bloody.  Is not bilious.  She also reports associated abdominal pain.  She reports generalized abdominal discomfort.  She denies any prior history abdominal surgery.  She states that she has a hysterectomy and denies chance of pregnancy.  No urinary symptoms.  She is not having any diarrhea or constipation.  No chest pain or shortness of breath.  No headache or cough or fever or sore throat.    Nursing Notes were all reviewed and agreed with or any disagreements were addressed in the HPI.      REVIEW OF EXTERNAL NOTE :           PDMP Monitoring:    Last PDMP Tashi as Reviewed:  Review User Review Instant Review Result   ROSA M MCKEON 12/3/2019 10:52 PM Reviewed PDMP [1]       Urine Drug Screenings (1 yr)    No resulted procedures found.       Medication Contract and Consent for Opioid Use Documents Filed        No documents found                      REVIEW OF SYSTEMS :           Positives and Pertinent negatives as per HPI.     SURGICAL HISTORY     Past Surgical History:   Procedure Laterality Date    ANKLE SURGERY      BREAST SURGERY Bilateral     lift    KNEE ARTHROSCOPY      right    LESTER  TOOTH EXTRACTION         CURRENTMEDICATIONS       Previous Medications    BUDESONIDE-FORMOTEROL (SYMBICORT) 80-4.5 MCG/ACT AERO    Inhale 2 puffs into the lungs daily.    CETIRIZINE (ZYRTEC) 10 MG TABLET    Take 10 mg by mouth daily.    FERROUS SULFATE 325 (65 FE) MG TABLET    Take 325 mg by mouth daily (with breakfast).    FEXOFENADINE (ALLEGRA) 60 MG TABLET    Take 60 mg by mouth daily.    MULTIVITAMIN (THERAGRAN) PER TABLET    Take 1 tablet by mouth daily.    NAPROXEN (NAPROSYN) 500 MG TABLET    Take 1 tablet by mouth 2 times daily as needed for Pain    TOPIRAMATE ER PO    Take by mouth nightly    VERAPAMIL (CALAN SR) 120 MG EXTENDED RELEASE TABLET    Take 120 mg by mouth nightly    VITAMIN D (CHOLECALCIFEROL) 1000 UNITS CAPS CAPSULE    Take 1,000 Units by mouth daily.       ALLERGIES     Effexor [venlafaxine]    FAMILYHISTORY       Family History   Problem Relation Age of Onset    Prostate Cancer Maternal Grandfather         SOCIAL HISTORY       Social History     Tobacco Use    Smoking status: Never    Smokeless tobacco: Never   Substance Use Topics    Alcohol use: Yes     Comment: social    Drug use: No       SCREENINGS        Bryan Coma Scale  Eye Opening: Spontaneous  Best Verbal Response: Oriented  Best Motor Response: Obeys commands  Bryan Coma Scale Score: 15                CIWA Assessment  BP: 123/89  Pulse: 78           PHYSICAL EXAM  1 or more Elements     ED Triage Vitals [03/11/25 0503]   BP Systolic BP Percentile Diastolic BP Percentile Temp Temp Source Pulse Resp SpO2   -- -- -- 97.3 °F (36.3 °C) Oral 73 -- 100 %      Height Weight         -- --                      Constitutional/General: Alert and oriented x3  Head: Normocephalic and atraumatic  Eyes: PERRL, EOMI, conjunctiva normal, sclera non icteric  Respiratory: Lungs clear to auscultation bilaterally, no wheezes, rales, or rhonchi. Not in respiratory distress  Cardiovascular:  Regular rate. Regular rhythm.  2+ distal pulses. Equal

## 2025-03-11 NOTE — ED NOTES
Janelle Lai DO [facility] Pt asking for pain meds in waiting room. Will notify MD

## 2025-03-12 ENCOUNTER — ANESTHESIA (OUTPATIENT)
Dept: OPERATING ROOM | Age: 50
DRG: 418 | End: 2025-03-12
Payer: OTHER GOVERNMENT

## 2025-03-12 LAB
ALBUMIN SERPL-MCNC: 3.7 G/DL (ref 3.5–5.2)
ALP SERPL-CCNC: 101 U/L (ref 35–104)
ALT SERPL-CCNC: 51 U/L (ref 0–32)
ANION GAP SERPL CALCULATED.3IONS-SCNC: 14 MMOL/L (ref 7–16)
AST SERPL-CCNC: 43 U/L (ref 0–31)
BASOPHILS # BLD: 0.04 K/UL (ref 0–0.2)
BASOPHILS NFR BLD: 1 % (ref 0–2)
BILIRUB SERPL-MCNC: 0.4 MG/DL (ref 0–1.2)
BUN SERPL-MCNC: 9 MG/DL (ref 6–20)
CALCIUM SERPL-MCNC: 9.5 MG/DL (ref 8.6–10.2)
CHLORIDE SERPL-SCNC: 103 MMOL/L (ref 98–107)
CO2 SERPL-SCNC: 25 MMOL/L (ref 22–29)
CREAT SERPL-MCNC: 1 MG/DL (ref 0.5–1)
EOSINOPHIL # BLD: 0.23 K/UL (ref 0.05–0.5)
EOSINOPHILS RELATIVE PERCENT: 3 % (ref 0–6)
ERYTHROCYTE [DISTWIDTH] IN BLOOD BY AUTOMATED COUNT: 17.3 % (ref 11.5–15)
GFR, ESTIMATED: 65 ML/MIN/1.73M2
GLUCOSE SERPL-MCNC: 86 MG/DL (ref 74–99)
HCT VFR BLD AUTO: 40.4 % (ref 34–48)
HGB BLD-MCNC: 12.1 G/DL (ref 11.5–15.5)
IMM GRANULOCYTES # BLD AUTO: <0.03 K/UL (ref 0–0.58)
IMM GRANULOCYTES NFR BLD: 0 % (ref 0–5)
LYMPHOCYTES NFR BLD: 2.09 K/UL (ref 1.5–4)
LYMPHOCYTES RELATIVE PERCENT: 28 % (ref 20–42)
MCH RBC QN AUTO: 26.1 PG (ref 26–35)
MCHC RBC AUTO-ENTMCNC: 30 G/DL (ref 32–34.5)
MCV RBC AUTO: 87.3 FL (ref 80–99.9)
MONOCYTES NFR BLD: 0.53 K/UL (ref 0.1–0.95)
MONOCYTES NFR BLD: 7 % (ref 2–12)
NEUTROPHILS NFR BLD: 61 % (ref 43–80)
NEUTS SEG NFR BLD: 4.59 K/UL (ref 1.8–7.3)
PLATELET # BLD AUTO: 256 K/UL (ref 130–450)
PMV BLD AUTO: 10.7 FL (ref 7–12)
POTASSIUM SERPL-SCNC: 4.1 MMOL/L (ref 3.5–5)
PROT SERPL-MCNC: 6.7 G/DL (ref 6.4–8.3)
RBC # BLD AUTO: 4.63 M/UL (ref 3.5–5.5)
SODIUM SERPL-SCNC: 142 MMOL/L (ref 132–146)
WBC OTHER # BLD: 7.5 K/UL (ref 4.5–11.5)

## 2025-03-12 PROCEDURE — 6360000002 HC RX W HCPCS: Performed by: STUDENT IN AN ORGANIZED HEALTH CARE EDUCATION/TRAINING PROGRAM

## 2025-03-12 PROCEDURE — 2720000010 HC SURG SUPPLY STERILE: Performed by: SURGERY

## 2025-03-12 PROCEDURE — 6360000002 HC RX W HCPCS

## 2025-03-12 PROCEDURE — 2500000003 HC RX 250 WO HCPCS

## 2025-03-12 PROCEDURE — 85025 COMPLETE CBC W/AUTO DIFF WBC: CPT

## 2025-03-12 PROCEDURE — 6370000000 HC RX 637 (ALT 250 FOR IP)

## 2025-03-12 PROCEDURE — 2709999900 HC NON-CHARGEABLE SUPPLY: Performed by: SURGERY

## 2025-03-12 PROCEDURE — 80053 COMPREHEN METABOLIC PANEL: CPT

## 2025-03-12 PROCEDURE — 6370000000 HC RX 637 (ALT 250 FOR IP): Performed by: STUDENT IN AN ORGANIZED HEALTH CARE EDUCATION/TRAINING PROGRAM

## 2025-03-12 PROCEDURE — 36415 COLL VENOUS BLD VENIPUNCTURE: CPT

## 2025-03-12 PROCEDURE — 7100000000 HC PACU RECOVERY - FIRST 15 MIN: Performed by: SURGERY

## 2025-03-12 PROCEDURE — 3700000001 HC ADD 15 MINUTES (ANESTHESIA): Performed by: SURGERY

## 2025-03-12 PROCEDURE — 2580000003 HC RX 258: Performed by: STUDENT IN AN ORGANIZED HEALTH CARE EDUCATION/TRAINING PROGRAM

## 2025-03-12 PROCEDURE — 6360000002 HC RX W HCPCS: Performed by: SURGERY

## 2025-03-12 PROCEDURE — 3600000014 HC SURGERY LEVEL 4 ADDTL 15MIN: Performed by: SURGERY

## 2025-03-12 PROCEDURE — 3600000004 HC SURGERY LEVEL 4 BASE: Performed by: SURGERY

## 2025-03-12 PROCEDURE — 94640 AIRWAY INHALATION TREATMENT: CPT

## 2025-03-12 PROCEDURE — 88304 TISSUE EXAM BY PATHOLOGIST: CPT

## 2025-03-12 PROCEDURE — 0FT44ZZ RESECTION OF GALLBLADDER, PERCUTANEOUS ENDOSCOPIC APPROACH: ICD-10-PCS | Performed by: SURGERY

## 2025-03-12 PROCEDURE — 2580000003 HC RX 258

## 2025-03-12 PROCEDURE — 1200000000 HC SEMI PRIVATE

## 2025-03-12 PROCEDURE — 47562 LAPAROSCOPIC CHOLECYSTECTOMY: CPT | Performed by: SURGERY

## 2025-03-12 PROCEDURE — 7100000001 HC PACU RECOVERY - ADDTL 15 MIN: Performed by: SURGERY

## 2025-03-12 PROCEDURE — 3700000000 HC ANESTHESIA ATTENDED CARE: Performed by: SURGERY

## 2025-03-12 RX ORDER — OXYCODONE HYDROCHLORIDE 5 MG/1
5 TABLET ORAL EVERY 6 HOURS PRN
Qty: 12 TABLET | Refills: 0 | Status: SHIPPED | OUTPATIENT
Start: 2025-03-12 | End: 2025-03-15

## 2025-03-12 RX ORDER — LIDOCAINE HYDROCHLORIDE 20 MG/ML
INJECTION, SOLUTION INTRAVENOUS
Status: DISCONTINUED | OUTPATIENT
Start: 2025-03-12 | End: 2025-03-12 | Stop reason: SDUPTHER

## 2025-03-12 RX ORDER — LABETALOL HYDROCHLORIDE 5 MG/ML
5 INJECTION, SOLUTION INTRAVENOUS
Status: DISCONTINUED | OUTPATIENT
Start: 2025-03-12 | End: 2025-03-12 | Stop reason: HOSPADM

## 2025-03-12 RX ORDER — DIPHENHYDRAMINE HYDROCHLORIDE 50 MG/ML
12.5 INJECTION, SOLUTION INTRAMUSCULAR; INTRAVENOUS
Status: DISCONTINUED | OUTPATIENT
Start: 2025-03-12 | End: 2025-03-12 | Stop reason: HOSPADM

## 2025-03-12 RX ORDER — SODIUM CHLORIDE 0.9 % (FLUSH) 0.9 %
5-40 SYRINGE (ML) INJECTION EVERY 12 HOURS SCHEDULED
Status: DISCONTINUED | OUTPATIENT
Start: 2025-03-12 | End: 2025-03-12 | Stop reason: HOSPADM

## 2025-03-12 RX ORDER — FENTANYL CITRATE 50 UG/ML
INJECTION, SOLUTION INTRAMUSCULAR; INTRAVENOUS
Status: DISCONTINUED | OUTPATIENT
Start: 2025-03-12 | End: 2025-03-12 | Stop reason: SDUPTHER

## 2025-03-12 RX ORDER — PROPOFOL 10 MG/ML
INJECTION, EMULSION INTRAVENOUS
Status: DISCONTINUED | OUTPATIENT
Start: 2025-03-12 | End: 2025-03-12 | Stop reason: SDUPTHER

## 2025-03-12 RX ORDER — SODIUM CHLORIDE 0.9 % (FLUSH) 0.9 %
5-40 SYRINGE (ML) INJECTION PRN
Status: DISCONTINUED | OUTPATIENT
Start: 2025-03-12 | End: 2025-03-12 | Stop reason: HOSPADM

## 2025-03-12 RX ORDER — MEPERIDINE HYDROCHLORIDE 25 MG/ML
12.5 INJECTION INTRAMUSCULAR; INTRAVENOUS; SUBCUTANEOUS
Status: DISCONTINUED | OUTPATIENT
Start: 2025-03-12 | End: 2025-03-12 | Stop reason: HOSPADM

## 2025-03-12 RX ORDER — SODIUM CHLORIDE, SODIUM LACTATE, POTASSIUM CHLORIDE, CALCIUM CHLORIDE 600; 310; 30; 20 MG/100ML; MG/100ML; MG/100ML; MG/100ML
INJECTION, SOLUTION INTRAVENOUS CONTINUOUS
Status: DISCONTINUED | OUTPATIENT
Start: 2025-03-12 | End: 2025-03-13 | Stop reason: HOSPADM

## 2025-03-12 RX ORDER — ROCURONIUM BROMIDE 10 MG/ML
INJECTION, SOLUTION INTRAVENOUS
Status: DISCONTINUED | OUTPATIENT
Start: 2025-03-12 | End: 2025-03-12 | Stop reason: SDUPTHER

## 2025-03-12 RX ORDER — SODIUM CHLORIDE 9 MG/ML
INJECTION, SOLUTION INTRAVENOUS
Status: DISCONTINUED | OUTPATIENT
Start: 2025-03-12 | End: 2025-03-12 | Stop reason: SDUPTHER

## 2025-03-12 RX ORDER — METRONIDAZOLE 500 MG/100ML
INJECTION, SOLUTION INTRAVENOUS
Status: DISCONTINUED | OUTPATIENT
Start: 2025-03-12 | End: 2025-03-12 | Stop reason: SDUPTHER

## 2025-03-12 RX ORDER — ALBUTEROL SULFATE 90 UG/1
INHALANT RESPIRATORY (INHALATION)
Status: DISCONTINUED | OUTPATIENT
Start: 2025-03-12 | End: 2025-03-12 | Stop reason: SDUPTHER

## 2025-03-12 RX ORDER — PROCHLORPERAZINE EDISYLATE 5 MG/ML
5 INJECTION INTRAMUSCULAR; INTRAVENOUS
Status: DISCONTINUED | OUTPATIENT
Start: 2025-03-12 | End: 2025-03-12 | Stop reason: HOSPADM

## 2025-03-12 RX ORDER — NALOXONE HYDROCHLORIDE 0.4 MG/ML
INJECTION, SOLUTION INTRAMUSCULAR; INTRAVENOUS; SUBCUTANEOUS PRN
Status: DISCONTINUED | OUTPATIENT
Start: 2025-03-12 | End: 2025-03-12 | Stop reason: HOSPADM

## 2025-03-12 RX ORDER — DEXAMETHASONE SODIUM PHOSPHATE 10 MG/ML
INJECTION, SOLUTION INTRA-ARTICULAR; INTRALESIONAL; INTRAMUSCULAR; INTRAVENOUS; SOFT TISSUE
Status: DISCONTINUED | OUTPATIENT
Start: 2025-03-12 | End: 2025-03-12 | Stop reason: SDUPTHER

## 2025-03-12 RX ORDER — HYDROMORPHONE HYDROCHLORIDE 1 MG/ML
0.5 INJECTION, SOLUTION INTRAMUSCULAR; INTRAVENOUS; SUBCUTANEOUS EVERY 5 MIN PRN
Status: DISCONTINUED | OUTPATIENT
Start: 2025-03-12 | End: 2025-03-12 | Stop reason: HOSPADM

## 2025-03-12 RX ORDER — MIDAZOLAM HYDROCHLORIDE 1 MG/ML
INJECTION, SOLUTION INTRAMUSCULAR; INTRAVENOUS
Status: DISCONTINUED | OUTPATIENT
Start: 2025-03-12 | End: 2025-03-12 | Stop reason: SDUPTHER

## 2025-03-12 RX ORDER — IPRATROPIUM BROMIDE AND ALBUTEROL SULFATE 2.5; .5 MG/3ML; MG/3ML
1 SOLUTION RESPIRATORY (INHALATION)
Status: DISCONTINUED | OUTPATIENT
Start: 2025-03-12 | End: 2025-03-12 | Stop reason: HOSPADM

## 2025-03-12 RX ORDER — ONDANSETRON 2 MG/ML
INJECTION INTRAMUSCULAR; INTRAVENOUS
Status: DISCONTINUED | OUTPATIENT
Start: 2025-03-12 | End: 2025-03-12 | Stop reason: SDUPTHER

## 2025-03-12 RX ORDER — HYDROMORPHONE HYDROCHLORIDE 1 MG/ML
0.25 INJECTION, SOLUTION INTRAMUSCULAR; INTRAVENOUS; SUBCUTANEOUS EVERY 5 MIN PRN
Status: DISCONTINUED | OUTPATIENT
Start: 2025-03-12 | End: 2025-03-12 | Stop reason: HOSPADM

## 2025-03-12 RX ORDER — POLYETHYLENE GLYCOL 3350 17 G/17G
17 POWDER, FOR SOLUTION ORAL DAILY PRN
Qty: 510 G | Refills: 0 | Status: SHIPPED | OUTPATIENT
Start: 2025-03-12 | End: 2025-04-11

## 2025-03-12 RX ORDER — SODIUM CHLORIDE 9 MG/ML
INJECTION, SOLUTION INTRAVENOUS PRN
Status: DISCONTINUED | OUTPATIENT
Start: 2025-03-12 | End: 2025-03-12 | Stop reason: HOSPADM

## 2025-03-12 RX ORDER — ONDANSETRON 2 MG/ML
4 INJECTION INTRAMUSCULAR; INTRAVENOUS
Status: DISCONTINUED | OUTPATIENT
Start: 2025-03-12 | End: 2025-03-12 | Stop reason: HOSPADM

## 2025-03-12 RX ORDER — SCOPOLAMINE 1 MG/3D
PATCH, EXTENDED RELEASE TRANSDERMAL
Status: DISPENSED
Start: 2025-03-12 | End: 2025-03-12

## 2025-03-12 RX ADMIN — LIDOCAINE HYDROCHLORIDE 100 MG: 20 INJECTION, SOLUTION INTRAVENOUS at 08:03

## 2025-03-12 RX ADMIN — Medication 50 MG: at 08:03

## 2025-03-12 RX ADMIN — FENTANYL CITRATE 50 MCG: 50 INJECTION, SOLUTION INTRAMUSCULAR; INTRAVENOUS at 08:28

## 2025-03-12 RX ADMIN — FENTANYL CITRATE 50 MCG: 50 INJECTION, SOLUTION INTRAMUSCULAR; INTRAVENOUS at 08:11

## 2025-03-12 RX ADMIN — ALBUTEROL SULFATE 2 PUFF: 90 AEROSOL, METERED RESPIRATORY (INHALATION) at 08:40

## 2025-03-12 RX ADMIN — ARFORMOTEROL TARTRATE: 15 SOLUTION RESPIRATORY (INHALATION) at 21:31

## 2025-03-12 RX ADMIN — SODIUM CHLORIDE: 9 INJECTION, SOLUTION INTRAVENOUS at 09:11

## 2025-03-12 RX ADMIN — ACETAMINOPHEN 650 MG: 325 TABLET ORAL at 11:36

## 2025-03-12 RX ADMIN — ACETAMINOPHEN 650 MG: 325 TABLET ORAL at 17:28

## 2025-03-12 RX ADMIN — SUGAMMADEX 200 MG: 100 INJECTION, SOLUTION INTRAVENOUS at 09:23

## 2025-03-12 RX ADMIN — PROPRANOLOL HYDROCHLORIDE 10 MG: 10 TABLET ORAL at 20:13

## 2025-03-12 RX ADMIN — ROCURONIUM BROMIDE 10 MG: 50 INJECTION INTRAVENOUS at 08:36

## 2025-03-12 RX ADMIN — MIDAZOLAM 2 MG: 1 INJECTION INTRAMUSCULAR; INTRAVENOUS at 07:54

## 2025-03-12 RX ADMIN — ACETAMINOPHEN 650 MG: 325 TABLET ORAL at 05:48

## 2025-03-12 RX ADMIN — VERAPAMIL HYDROCHLORIDE 120 MG: 120 TABLET, FILM COATED, EXTENDED RELEASE ORAL at 20:13

## 2025-03-12 RX ADMIN — ONDANSETRON 4 MG: 2 INJECTION, SOLUTION INTRAMUSCULAR; INTRAVENOUS at 05:49

## 2025-03-12 RX ADMIN — SODIUM CHLORIDE: 9 INJECTION, SOLUTION INTRAVENOUS at 08:03

## 2025-03-12 RX ADMIN — ONDANSETRON 4 MG: 2 INJECTION, SOLUTION INTRAMUSCULAR; INTRAVENOUS at 09:11

## 2025-03-12 RX ADMIN — HYDROMORPHONE HYDROCHLORIDE 0.25 MG: 1 INJECTION, SOLUTION INTRAMUSCULAR; INTRAVENOUS; SUBCUTANEOUS at 10:37

## 2025-03-12 RX ADMIN — DEXAMETHASONE SODIUM PHOSPHATE 10 MG: 10 INJECTION INTRAMUSCULAR; INTRAVENOUS at 08:07

## 2025-03-12 RX ADMIN — CEFAZOLIN 2000 MG: 2 INJECTION, POWDER, FOR SOLUTION INTRAMUSCULAR; INTRAVENOUS at 05:48

## 2025-03-12 RX ADMIN — OXYCODONE HYDROCHLORIDE 10 MG: 10 TABLET ORAL at 20:12

## 2025-03-12 RX ADMIN — FENTANYL CITRATE 50 MCG: 50 INJECTION, SOLUTION INTRAMUSCULAR; INTRAVENOUS at 08:03

## 2025-03-12 RX ADMIN — METRONIDAZOLE 500 MG: 500 SOLUTION INTRAVENOUS at 08:07

## 2025-03-12 RX ADMIN — OXYCODONE 5 MG: 5 TABLET ORAL at 14:06

## 2025-03-12 RX ADMIN — PROPOFOL 150 MG: 10 INJECTION, EMULSION INTRAVENOUS at 08:03

## 2025-03-12 RX ADMIN — SODIUM CHLORIDE, SODIUM LACTATE, POTASSIUM CHLORIDE, AND CALCIUM CHLORIDE: .6; .31; .03; .02 INJECTION, SOLUTION INTRAVENOUS at 11:38

## 2025-03-12 RX ADMIN — ROCURONIUM BROMIDE 40 MG: 50 INJECTION INTRAVENOUS at 08:03

## 2025-03-12 ASSESSMENT — PAIN DESCRIPTION - DESCRIPTORS
DESCRIPTORS: ACHING
DESCRIPTORS: ACHING;DISCOMFORT;NAGGING
DESCRIPTORS: ACHING;DISCOMFORT;SORE

## 2025-03-12 ASSESSMENT — PAIN SCALES - GENERAL
PAINLEVEL_OUTOF10: 3
PAINLEVEL_OUTOF10: 7
PAINLEVEL_OUTOF10: 4
PAINLEVEL_OUTOF10: 2
PAINLEVEL_OUTOF10: 6
PAINLEVEL_OUTOF10: 2
PAINLEVEL_OUTOF10: 0
PAINLEVEL_OUTOF10: 4
PAINLEVEL_OUTOF10: 2
PAINLEVEL_OUTOF10: 2
PAINLEVEL_OUTOF10: 7

## 2025-03-12 ASSESSMENT — PAIN DESCRIPTION - LOCATION
LOCATION: ABDOMEN

## 2025-03-12 ASSESSMENT — PAIN DESCRIPTION - PAIN TYPE
TYPE: ACUTE PAIN
TYPE: ACUTE PAIN

## 2025-03-12 ASSESSMENT — PAIN DESCRIPTION - ORIENTATION
ORIENTATION: RIGHT
ORIENTATION: RIGHT;UPPER
ORIENTATION: RIGHT

## 2025-03-12 ASSESSMENT — PAIN - FUNCTIONAL ASSESSMENT
PAIN_FUNCTIONAL_ASSESSMENT: ACTIVITIES ARE NOT PREVENTED

## 2025-03-12 ASSESSMENT — PAIN DESCRIPTION - ONSET
ONSET: SUDDEN
ONSET: SUDDEN

## 2025-03-12 ASSESSMENT — PAIN DESCRIPTION - FREQUENCY
FREQUENCY: INTERMITTENT
FREQUENCY: INTERMITTENT

## 2025-03-12 ASSESSMENT — ENCOUNTER SYMPTOMS: SHORTNESS OF BREATH: 0

## 2025-03-12 NOTE — OP NOTE
Operative Note      Patient: Eileen Bhakta  YOB: 1975  MRN: [id number]    Date of Procedure: 3/12/2025    Pre-Op Diagnosis Codes:      * Acute cholecystitis [K81.0], obesity-BMI 43    Post-Op Diagnosis: Same       Procedure(s):  CHOLECYSTECTOMY LAPAROSCOPIC    Surgeon(s):  Shankar Kay MD    Assistant:   Resident: Young Magdaleno DO    Anesthesia: General    Estimated Blood Loss (mL): less than 20     Complications: None    Specimens:   ID Type Source Tests Collected by Time Destination   A : GALLBLADDER AND CONTENTS Tissue Tissue SURGICAL PATHOLOGY Shankar Kay MD 3/12/2025 0855        Implants:  * No implants in log *      Drains: * No LDAs found *    Findings:  Infection Present At Time Of Surgery (PATOS) (choose all levels that have infection present):  - Organ Space infection (below fascia) present as evidenced by fluid consistent with infection  Other Findings: acute cholecystitis with hydropic gallbladder    Indications for procedure:   This is a 49-year-old female, who presented to the emergency department on 3/11/2025 with abdominal pain.  Workup and examination consistent with acute cholecystitis.  Laparoscopic cholecystectomy was discussed with the patient.  The risks/benefits/alternatives to the procedure were discussed with the patient/responsible party, and they have agreed to undergo the procedure.    Details of procedure:  The patient was brought to the operating room and positioned supine on the OR table.  All pressure points were adequately padded.  SCDs were applied to the bilateral lower extremities and ensured to be functioning properly.  The patient was secured to the table with a safety strap.  General anesthesia was administered per anesthesia protocol.  The patient had been receiving scheduled IV Rocephin and Flagyl perioperatively. The abdomen and all surrounding areas were prepped and draped in the usual sterile fashion.    Using a #15 scalpel blade, a 5 mm  instrument counts were correct x 2    The patient was extubated by the anesthesia team without complication.  They were transferred to the PACU in good condition.  They will be returned to their hospital room once recovered from anesthesia    Dr. Kay was present and scrubbed in for the entire procedure      Electronically signed by Young Magdaleno DO on 3/12/2025 at 9:27 AM    [facility]   Attending Physician Statement:  I was present during the entire procedure and was actively supervising and directing the resident. There were no complications.    Shankar Kay MD, FACS  3/12/2025  2:58 PM

## 2025-03-12 NOTE — PLAN OF CARE
Problem: Discharge Planning  Goal: Discharge to home or other facility with appropriate resources  3/12/2025 0019 by Rowan Jiménez RN  Outcome: Progressing  3/11/2025 1815 by Izabela Newton RN  Outcome: Progressing     Problem: Pain  Goal: Verbalizes/displays adequate comfort level or baseline comfort level  3/12/2025 0019 by Rowan Jiménez RN  Outcome: Progressing  3/11/2025 1815 by Izabela Newton RN  Outcome: Progressing     Problem: ABCDS Injury Assessment  Goal: Absence of physical injury  Outcome: Progressing

## 2025-03-12 NOTE — CARE COORDINATION
3/12/2025social work transition of care planning  Pt is from home with family and had been independent. Pt pcp is Vashti Torres at [facility]. [facility]. Explained sw role in transition of care planning. Pt plan is home,pt will call for a ride at [place].  Electronically signed by FABIAN Alexandre on 3/12/2025 at 1:50 PM

## 2025-03-12 NOTE — PROGRESS NOTES
GENERAL SURGERY  DAILY PROGRESS NOTE  3/12/2025    Subjective:  No overnight events.  Persistent right upper quadrant abdominal pain, slightly improved since admission.    Objective:  /79   Pulse 64   Temp 97.8 °F (36.6 °C) (Temporal)   Resp 18   Ht 1.575 m (5' 2\")   Wt 106.6 kg (235 lb)   LMP 04/12/2013   SpO2 95%   BMI 42.98 kg/m²     General Appearance:  awake, alert, oriented, in no acute distress  Skin:  Skin color, texture, turgor normal  Head/face:  NCAT  Eyes:  No gross abnormalities. Sclera nonicteric  Lungs/Chest:  Normal expansion. No respiratory distress. On room air  Heart: Warm throughout. Regular rate   Abdomen:  Soft, moderate RUQ tenderness, positive Rodríguez sign, obese.    Extremities: Extremities warm to touch, pink, with no edema.      I have personally reviewed all relevant labs and imaging.    Assessment/Plan:  49 y.o. female with acute cholecystitis    -Laparoscopic cholecystectomy, possible open.  Today, 3/12  -N.p.o. for procedure  -Pain control as needed  -IVF  -IV Rocephin/Flagyl  -Monitor CBC, CMP      The risks, benefits, alternatives, and potential complications of the procedure, including the risks of bleeding, infection, injury to surrounding structures, and need for additional procedures were explained to the patient/responsible party. All questions were answered. They understand and agree to the procedure.         Electronically signed by Young Magdaleno DO on 3/12/2025 at 6:09 AM

## 2025-03-12 NOTE — ANESTHESIA PRE PROCEDURE
Active Problem List   Diagnosis Code   • Acute cholecystitis K81.0   • Morbid obesity with BMI of 40.0-44.9, adult E66.01, Z68.41       Past Medical History:        Diagnosis Date   • Anemia    • Tension headache    • Vitamin D deficiency disease        Past Surgical History:        Procedure Laterality Date   • ANKLE SURGERY     • BREAST SURGERY Bilateral     lift   • KNEE ARTHROSCOPY      right   • WISDOM TOOTH EXTRACTION         Social History:    Social History     Tobacco Use   • Smoking status: Never   • Smokeless tobacco: Never   Substance Use Topics   • Alcohol use: Yes     Comment: social                                Counseling given: Not Answered      Vital Signs (Current):   Vitals:    03/11/25 0607 03/11/25 1551 03/11/25 1613 03/11/25 1930   BP: 123/89 113/74 123/75 121/79   Pulse: 78 82 70 64   Resp: 17 18 18 18   Temp: 97.3 °F (36.3 °C) 98.2 °F (36.8 °C) 97.5 °F (36.4 °C) 97.8 °F (36.6 °C)   TempSrc:   Temporal Temporal   SpO2: 98% 100% 96% 95%   Weight: 106.6 kg (235 lb)      Height: 1.575 m (5' 2\")                                                 BP Readings from Last 3 Encounters:   03/11/25 121/79   02/21/23 (!) 147/94   02/17/23 (!) 144/82       NPO Status: Time of last liquid consumption: 2350                        Time of last solid consumption: 0800                        Date of last liquid consumption: 03/11/25                        Date of last solid food consumption: 03/11/25    BMI:   Wt Readings from Last 3 Encounters:   03/11/25 106.6 kg (235 lb)   01/02/24 96.6 kg (213 lb)   02/21/23 97.5 kg (215 lb)     Body mass index is 42.98 kg/m².    CBC:   Lab Results   Component Value Date/Time    WBC 7.5 03/12/2025 05:00 AM    RBC 4.63 03/12/2025 05:00 AM    HGB 12.1 03/12/2025 05:00 AM    HCT 40.4 03/12/2025 05:00 AM    MCV 87.3 03/12/2025 05:00 AM    RDW 17.3 03/12/2025 05:00 AM     03/12/2025 05:00 AM       CMP:   Lab Results   Component Value Date/Time     03/11/2025 06:41 AM

## 2025-03-12 NOTE — ANESTHESIA POSTPROCEDURE EVALUATION
Department of Anesthesiology  Postprocedure Note    Patient: Eileen Bhakta  MRN: [id number]  YOB: 1975  Date of evaluation: 3/12/2025    Procedure Summary       Date: 03/12/25 Room / Location: [address]    Anesthesia Start: 0754 Anesthesia Stop: 0943    Procedure: CHOLECYSTECTOMY LAPAROSCOPIC (Abdomen) Diagnosis:       Acute cholecystitis      (Acute cholecystitis [K81.0])    Surgeons: Shankar Kay MD Responsible Provider: Artem Velasco DO    Anesthesia Type: general ASA Status: 2            Anesthesia Type: No value filed.    Stephen Phase I: Stephen Score: 9    Stephen Phase II:      Anesthesia Post Evaluation    Patient location during evaluation: PACU  Patient participation: complete - patient participated  Level of consciousness: awake  Cardiovascular status: blood pressure returned to baseline  Respiratory status: acceptable  Hydration status: euvolemic  Multimodal analgesia pain management approach  Pain management: adequate        No notable events documented.

## 2025-03-12 NOTE — DISCHARGE INSTRUCTIONS
A Cholecystectomy is the surgical removal of the gallbladder.      Home Care    You have glue on your incisions - Do not apply lotion, gel, or liquid to wound while the glue is in place.   Place ice on incisions to decrease the pain and bruising.  You may shower 24 hours after surgery. No bathing or swimming for 14 days.      Diet    You may resume a regular diet as tolerated. Your liver will take over the functions of the gallbladder, although some people notice that they have a little more trouble digesting fatty foods, particularly for the first month after surgery. You may notice increased gas or changes in your bowel habits during the first month after surgery. Keep the bowels soft with Metamucil and bran cereal.    No heavy lifting for 4 weeks - nothing over 10 lbs, or heavier than a milk jug.    Physical Activity    Walk frequently to prevent blood clots in the legs but do not do anything that causes pain in the incisions.   No heavy lifting for 4 weeks - nothing over 10 lbs, or heavier than a milk jug.  Breath deeply every hour to prevent pneumonia.    Medications    Restart blood thinners in 24 hours if no sign of bleeding  Take Ibuprofen and Tylenol for moderate pain.   Use the prescribed medication only for more severe pain.  Prescriptions will be sent with you.  Use as directed.  When taking pain medications, you may experience dizziness or drowsiness.  Do not drink alcohol or drive when taking these medications  Do not share your prescriptions    Call for any of the Following Occurs     Signs of infection, including fever and chills   Increasing pain, excessive bleeding, worsening redness, or discharge at the incision sites  Cough, shortness of breath, chest pain   Increased abdominal pain   Nausea and/or vomiting that does not resolve off narcotics.  Pain, burning, urgency or frequency of urination, or blood in the urine   Pain and/or swelling in your feet, calves, or legs   Dark urine, light stools,  or evidence of jaundice (yellowing of the skin or eyes).     In case of an emergency,    CALL 911 Immediately.

## 2025-03-13 VITALS
OXYGEN SATURATION: 96 % | TEMPERATURE: 98 F | SYSTOLIC BLOOD PRESSURE: 123 MMHG | HEIGHT: 62 IN | HEART RATE: 71 BPM | DIASTOLIC BLOOD PRESSURE: 69 MMHG | RESPIRATION RATE: 16 BRPM | WEIGHT: 235 LBS | BODY MASS INDEX: 43.24 KG/M2

## 2025-03-13 LAB
ALBUMIN SERPL-MCNC: 3.7 G/DL (ref 3.5–5.2)
ALP SERPL-CCNC: 97 U/L (ref 35–104)
ALT SERPL-CCNC: 80 U/L (ref 0–32)
ANION GAP SERPL CALCULATED.3IONS-SCNC: 12 MMOL/L (ref 7–16)
AST SERPL-CCNC: 60 U/L (ref 0–31)
BASOPHILS # BLD: 0.01 K/UL (ref 0–0.2)
BASOPHILS NFR BLD: 0 % (ref 0–2)
BILIRUB SERPL-MCNC: 0.2 MG/DL (ref 0–1.2)
BUN SERPL-MCNC: 13 MG/DL (ref 6–20)
CALCIUM SERPL-MCNC: 9.2 MG/DL (ref 8.6–10.2)
CHLORIDE SERPL-SCNC: 102 MMOL/L (ref 98–107)
CO2 SERPL-SCNC: 23 MMOL/L (ref 22–29)
CREAT SERPL-MCNC: 1 MG/DL (ref 0.5–1)
EOSINOPHIL # BLD: 0 K/UL (ref 0.05–0.5)
EOSINOPHILS RELATIVE PERCENT: 0 % (ref 0–6)
ERYTHROCYTE [DISTWIDTH] IN BLOOD BY AUTOMATED COUNT: 17.2 % (ref 11.5–15)
GFR, ESTIMATED: 69 ML/MIN/1.73M2
GLUCOSE SERPL-MCNC: 100 MG/DL (ref 74–99)
HCT VFR BLD AUTO: 36.2 % (ref 34–48)
HGB BLD-MCNC: 11.1 G/DL (ref 11.5–15.5)
IMM GRANULOCYTES # BLD AUTO: 0.06 K/UL (ref 0–0.58)
IMM GRANULOCYTES NFR BLD: 1 % (ref 0–5)
LYMPHOCYTES NFR BLD: 1.65 K/UL (ref 1.5–4)
LYMPHOCYTES RELATIVE PERCENT: 13 % (ref 20–42)
MCH RBC QN AUTO: 26.4 PG (ref 26–35)
MCHC RBC AUTO-ENTMCNC: 30.7 G/DL (ref 32–34.5)
MCV RBC AUTO: 86 FL (ref 80–99.9)
MONOCYTES NFR BLD: 0.7 K/UL (ref 0.1–0.95)
MONOCYTES NFR BLD: 6 % (ref 2–12)
NEUTROPHILS NFR BLD: 81 % (ref 43–80)
NEUTS SEG NFR BLD: 10.01 K/UL (ref 1.8–7.3)
PLATELET # BLD AUTO: 241 K/UL (ref 130–450)
PMV BLD AUTO: 10.4 FL (ref 7–12)
POTASSIUM SERPL-SCNC: 4.3 MMOL/L (ref 3.5–5)
PROT SERPL-MCNC: 6.3 G/DL (ref 6.4–8.3)
RBC # BLD AUTO: 4.21 M/UL (ref 3.5–5.5)
SODIUM SERPL-SCNC: 137 MMOL/L (ref 132–146)
WBC OTHER # BLD: 12.4 K/UL (ref 4.5–11.5)

## 2025-03-13 PROCEDURE — 6370000000 HC RX 637 (ALT 250 FOR IP): Performed by: STUDENT IN AN ORGANIZED HEALTH CARE EDUCATION/TRAINING PROGRAM

## 2025-03-13 PROCEDURE — 80053 COMPREHEN METABOLIC PANEL: CPT

## 2025-03-13 PROCEDURE — 36415 COLL VENOUS BLD VENIPUNCTURE: CPT

## 2025-03-13 PROCEDURE — 85025 COMPLETE CBC W/AUTO DIFF WBC: CPT

## 2025-03-13 RX ADMIN — FERROUS SULFATE TAB 325 MG (65 MG ELEMENTAL FE) 325 MG: 325 (65 FE) TAB at 08:20

## 2025-03-13 RX ADMIN — ACETAMINOPHEN 650 MG: 325 TABLET ORAL at 05:29

## 2025-03-13 RX ADMIN — OXYCODONE 5 MG: 5 TABLET ORAL at 08:26

## 2025-03-13 ASSESSMENT — PAIN SCALES - GENERAL
PAINLEVEL_OUTOF10: 5
PAINLEVEL_OUTOF10: 2

## 2025-03-13 ASSESSMENT — PAIN DESCRIPTION - ORIENTATION: ORIENTATION: MID

## 2025-03-13 ASSESSMENT — PAIN DESCRIPTION - LOCATION: LOCATION: ABDOMEN

## 2025-03-13 ASSESSMENT — PAIN DESCRIPTION - DESCRIPTORS: DESCRIPTORS: CRAMPING;ACHING;SORE

## 2025-03-13 ASSESSMENT — PAIN - FUNCTIONAL ASSESSMENT: PAIN_FUNCTIONAL_ASSESSMENT: PREVENTS OR INTERFERES SOME ACTIVE ACTIVITIES AND ADLS

## 2025-03-13 NOTE — DISCHARGE SUMMARY
Physician Discharge Summary     Patient ID:  Eileen Bhakta  [id number]  49 y.o.  1975    Admit date: 3/11/2025    Discharge date: 03/13/25 6:06 AM    Admitting Physician: Shankar Kay MD     Admission Diagnoses: Acute cholecystitis [K81.0]    Discharge Diagnoses: Principal Problem:    Acute cholecystitis  Active Problems:    Morbid obesity with BMI of 40.0-44.9, adult  Resolved Problems:    * No resolved hospital problems. *      Admission Condition: fair    Discharged Condition: stable    Indication for Admission: Acute cholecystitis    Hospital Course/Procedures/Operation/treatments:   49-year-old female, who presented to the emergency department on 3/11/2025 with abdominal pain.  Workup and examination were consistent with acute cholecystitis.  The patient was started on IV antibiotics and given IV fluid resuscitation.  The following day, she was taken to the operating room for laparoscopic cholecystectomy.  The procedure was performed without complication.  The gallbladder was severely hydropic and inflamed at the time of cholecystectomy.  The patient was discharged home on postoperative day 1 in good condition.  At the time of discharge, pain was controlled, diet was tolerated, the patient was ambulating, and urine output was appropriate.  Follow up information and return precautions have been discussed with the patient and included with discharge information.        Consults:   IP CONSULT TO GENERAL SURGERY    Significant Diagnostic Studies:   US GALLBLADDER RUQ  Result Date: 3/11/2025  EXAMINATION: RIGHT UPPER QUADRANT ULTRASOUND 3/11/2025 11:07 am COMPARISON: None. HISTORY: ORDERING SYSTEM PROVIDED HISTORY: gb thickening on ct/abd pain TECHNOLOGIST PROVIDED HISTORY: Reason for exam:->gb thickening on ct/abd pain What reading provider will be dictating this exam?->CRC FINDINGS: LIVER:  The liver demonstrates normal echogenicity without evidence of intrahepatic biliary ductal dilatation. BILIARY

## 2025-03-13 NOTE — PLAN OF CARE
Problem: Discharge Planning  Goal: Discharge to home or other facility with appropriate resources  3/13/2025 0945 by Deana Sung RN  Outcome: Adequate for Discharge  3/12/2025 2142 by Rowan Jiménez RN  Outcome: Progressing     Problem: Pain  Goal: Verbalizes/displays adequate comfort level or baseline comfort level  3/13/2025 0945 by Deana Sung RN  Outcome: Adequate for Discharge  3/12/2025 2142 by Rowan Jiménez RN  Outcome: Progressing     Problem: ABCDS Injury Assessment  Goal: Absence of physical injury  3/13/2025 0945 by Deana Sung RN  Outcome: Adequate for Discharge  3/12/2025 2142 by Rowan Jiménez RN  Outcome: Progressing

## 2025-03-13 NOTE — CARE COORDINATION
3/13/2025social work transition of care planning  Pt plan is home,no needs. Pt will call for ride at [place].    Electronically signed by FABIAN Alexandre on 3/13/2025 at 8:30 AM

## 2025-03-13 NOTE — PLAN OF CARE
Problem: Discharge Planning  Goal: Discharge to home or other facility with appropriate resources  3/12/2025 2142 by Rowan Jiménez RN  Outcome: Progressing  3/12/2025 1751 by Izabela Newton RN  Outcome: Progressing     Problem: Pain  Goal: Verbalizes/displays adequate comfort level or baseline comfort level  3/12/2025 2142 by Rowan Jiménez RN  Outcome: Progressing  3/12/2025 1751 by Izabela Newton RN  Outcome: Progressing     Problem: ABCDS Injury Assessment  Goal: Absence of physical injury  3/12/2025 2142 by Rowan Jiménez RN  Outcome: Progressing  3/12/2025 1751 by Izabela Newton RN  Outcome: Progressing

## 2025-03-13 NOTE — PROGRESS NOTES
CLINICAL PHARMACY NOTE: MEDS TO BEDS    Total # of Prescriptions Filled: 2   The following medications were delivered to the patient:  Miralax 510g  Oxycodone 5mg tabs    Additional Documentation:  To pt at the pharmacy

## 2025-03-17 ENCOUNTER — TELEPHONE (OUTPATIENT)
Dept: ADMINISTRATIVE | Age: 50
End: 2025-03-17

## 2025-03-17 NOTE — TELEPHONE ENCOUNTER
CHOLECYSTECTOMY LAPAROSCOPIC post op 3/15 - W/Dr Kay.  Also needs a RTW slip for today.     [phone]

## 2025-03-17 NOTE — TELEPHONE ENCOUNTER
MA spoke with patient scheduled post-op on 03/27/2025 @ 1:00pm.   Letter written and email to [email or web address]  Electronically signed by Isadora Peres MA on 3/17/25 at 4:26 PM EDT

## 2025-03-18 LAB — SURGICAL PATHOLOGY REPORT: NORMAL

## 2025-03-27 ENCOUNTER — OFFICE VISIT (OUTPATIENT)
Dept: SURGERY | Age: 50
End: 2025-03-27
Payer: OTHER GOVERNMENT

## 2025-03-27 VITALS
SYSTOLIC BLOOD PRESSURE: 141 MMHG | HEIGHT: 62 IN | RESPIRATION RATE: 16 BRPM | DIASTOLIC BLOOD PRESSURE: 90 MMHG | BODY MASS INDEX: 43.06 KG/M2 | WEIGHT: 234 LBS | HEART RATE: 89 BPM

## 2025-03-27 DIAGNOSIS — Z09 POSTOP CHECK: Primary | ICD-10-CM

## 2025-03-27 PROCEDURE — 99212 OFFICE O/P EST SF 10 MIN: CPT | Performed by: SURGERY

## 2025-03-27 PROCEDURE — 99024 POSTOP FOLLOW-UP VISIT: CPT | Performed by: SURGERY

## 2025-03-27 NOTE — PROGRESS NOTES
Subjective:       Eileen Bhakta presents to the clinic 15 days following lap falguni. Eating a regular diet without difficulty. Bowel movements are Normal.  The patient is not having any pain..      Objective:      BP (!) 141/90   Pulse 89   Resp 16   Ht 1.575 m (5' 2\")   Wt 106.1 kg (234 lb)   LMP 04/12/2013   BMI 42.80 kg/m²     General:  alert, appears stated age, and cooperative   Abdomen: soft, non-tender   Incision:   healing well, no drainage, no erythema, no hernia, no seroma, no swelling, no dehiscence, incision well approximated       Assessment:      Doing well postoperatively.   S/p lap falguni on 3/12/2025 for acute cholecystitis     Plan:      1. Continue any current medications.  2. Wound care discussed.  Path report reviewed      Path Number: [id number]    -- Diagnosis --  Gallbladder, cholecystectomy: Acute, superimposed on chronic,  cholecystitis  Cholelithiasis and cholesterolosis     3. No lifting>20 pounds until April 12. Continue to work  4. Follow up:  PRN

## 2025-04-14 ENCOUNTER — HOSPITAL ENCOUNTER (OUTPATIENT)
Dept: GENERAL RADIOLOGY | Age: 50
Discharge: HOME | End: 2025-04-16
Payer: OTHER GOVERNMENT

## 2025-04-14 ENCOUNTER — HOSPITAL ENCOUNTER (OUTPATIENT)
Dept: GENERAL RADIOLOGY | Age: 50
End: 2025-04-14
Payer: OTHER GOVERNMENT

## 2025-04-14 DIAGNOSIS — R93.89 ABNORMAL FINDING ON IMAGING: ICD-10-CM

## 2025-04-14 PROCEDURE — G0279 TOMOSYNTHESIS, MAMMO: HCPCS

## (undated) DEVICE — SOLUTION IRRIG 1000ML 0.9% SOD CHL USP POUR PLAS BTL

## (undated) DEVICE — TROCAR: Brand: KII® SLEEVE

## (undated) DEVICE — NEEDLE CLOSURE OMNICLOSE

## (undated) DEVICE — AGENT HEMSTAT W2XL4IN OXIDIZED REGENERATED CELOS ABSRB

## (undated) DEVICE — STRAP POS MP 30X3 IN HK LOOP CLOSURE FOAM DISP

## (undated) DEVICE — INSUFFLATION NEEDLE TO ESTABLISH PNEUMOPERITONEUM.: Brand: INSUFFLATION NEEDLE

## (undated) DEVICE — LAPAROSCOPIC SCISSORS: Brand: EPIX LAPAROSCOPIC SCISSORS

## (undated) DEVICE — SOL IRR SOD CHL 0.9% TITAN XL CNTNR 3000ML

## (undated) DEVICE — GENERAL LAPAROSCOPY: Brand: MEDLINE INDUSTRIES, INC.

## (undated) DEVICE — SET INSUF TUBE HEAT ISO CONN DISP

## (undated) DEVICE — ELECTRODE PT RET AD L9FT HI MOIST COND ADH HYDRGEL CORDED

## (undated) DEVICE — PUMP SUC IRR TBNG L10FT W/ HNDPC ASSEMB STRYKEFLOW 2

## (undated) DEVICE — TROCAR: Brand: KII FIOS FIRST ENTRY

## (undated) DEVICE — SYRINGE 20ML LL S/C 50

## (undated) DEVICE — GARMENT,MEDLINE,DVT,INT,CALF,MED, GEN2: Brand: MEDLINE

## (undated) DEVICE — ELECTRODE ES 36CM LAP FLAT L HK COAT DISP CLEANCOAT

## (undated) DEVICE — APPLIER CLP M/L SHFT DIA5MM 15 LIG LIGAMAX 5

## (undated) DEVICE — TISSUE RETRIEVAL SYSTEM: Brand: INZII RETRIEVAL SYSTEM

## (undated) DEVICE — GLOVE SURG 7.5 PF POLYMER WHT STRL SIGN LTX ESSENTIAL LTX